# Patient Record
Sex: FEMALE | Race: WHITE | Employment: OTHER | URBAN - METROPOLITAN AREA
[De-identification: names, ages, dates, MRNs, and addresses within clinical notes are randomized per-mention and may not be internally consistent; named-entity substitution may affect disease eponyms.]

---

## 2018-03-29 ENCOUNTER — NURSE ONLY (OUTPATIENT)
Dept: FAMILY MEDICINE CLINIC | Age: 83
End: 2018-03-29

## 2018-03-29 DIAGNOSIS — Z51.81 ANTICOAGULATION MANAGEMENT ENCOUNTER: Primary | ICD-10-CM

## 2018-03-29 DIAGNOSIS — Z79.01 ANTICOAGULATION MANAGEMENT ENCOUNTER: Primary | ICD-10-CM

## 2018-04-02 ENCOUNTER — OFFICE VISIT (OUTPATIENT)
Dept: FAMILY MEDICINE CLINIC | Age: 83
End: 2018-04-02
Payer: MEDICARE

## 2018-04-02 VITALS
HEART RATE: 77 BPM | SYSTOLIC BLOOD PRESSURE: 138 MMHG | TEMPERATURE: 98.2 F | BODY MASS INDEX: 26.31 KG/M2 | RESPIRATION RATE: 20 BRPM | WEIGHT: 154.12 LBS | HEIGHT: 64 IN | OXYGEN SATURATION: 96 % | DIASTOLIC BLOOD PRESSURE: 68 MMHG

## 2018-04-02 DIAGNOSIS — Z95.2 S/P MVR (MITRAL VALVE REPLACEMENT): Primary | ICD-10-CM

## 2018-04-02 DIAGNOSIS — F33.1 MAJOR DEPRESSIVE DISORDER, RECURRENT EPISODE, MODERATE (HCC): ICD-10-CM

## 2018-04-02 DIAGNOSIS — I10 ESSENTIAL HYPERTENSION: ICD-10-CM

## 2018-04-02 PROCEDURE — 99213 OFFICE O/P EST LOW 20 MIN: CPT | Performed by: FAMILY MEDICINE

## 2018-04-04 ENCOUNTER — NURSE ONLY (OUTPATIENT)
Dept: FAMILY MEDICINE CLINIC | Age: 83
End: 2018-04-04
Payer: MEDICARE

## 2018-04-04 ENCOUNTER — HOSPITAL ENCOUNTER (OUTPATIENT)
Age: 83
Discharge: HOME | End: 2018-04-06
Payer: MEDICARE

## 2018-04-04 DIAGNOSIS — Z51.81 ANTICOAGULATION MANAGEMENT ENCOUNTER: ICD-10-CM

## 2018-04-04 DIAGNOSIS — I10 ESSENTIAL HYPERTENSION: ICD-10-CM

## 2018-04-04 DIAGNOSIS — Z79.01 ANTICOAGULATION MANAGEMENT ENCOUNTER: ICD-10-CM

## 2018-04-04 DIAGNOSIS — E78.2 MIXED HYPERCHOLESTEROLEMIA AND HYPERTRIGLYCERIDEMIA: ICD-10-CM

## 2018-04-04 LAB
ALBUMIN SERPL-MCNC: 3.8 G/DL (ref 3.5–5.2)
ALP BLD-CCNC: 56 U/L (ref 35–104)
ALT SERPL-CCNC: 16 U/L (ref 0–32)
ANION GAP SERPL CALCULATED.3IONS-SCNC: 14 MMOL/L (ref 7–16)
AST SERPL-CCNC: 29 U/L (ref 0–31)
BASOPHILS ABSOLUTE: 0.04 E9/L (ref 0–0.2)
BASOPHILS RELATIVE PERCENT: 0.8 % (ref 0–2)
BILIRUB SERPL-MCNC: 0.2 MG/DL (ref 0–1.2)
BUN BLDV-MCNC: 17 MG/DL (ref 8–23)
CALCIUM SERPL-MCNC: 9.6 MG/DL (ref 8.6–10.2)
CHLORIDE BLD-SCNC: 104 MMOL/L (ref 98–107)
CHOLESTEROL, TOTAL: 186 MG/DL (ref 0–199)
CO2: 24 MMOL/L (ref 22–29)
CREAT SERPL-MCNC: 0.6 MG/DL (ref 0.5–1)
EOSINOPHILS ABSOLUTE: 0.19 E9/L (ref 0.05–0.5)
EOSINOPHILS RELATIVE PERCENT: 3.9 % (ref 0–6)
GFR AFRICAN AMERICAN: >60
GFR NON-AFRICAN AMERICAN: >60 ML/MIN/1.73
GLUCOSE BLD-MCNC: 96 MG/DL (ref 74–109)
HCT VFR BLD CALC: 38.2 % (ref 34–48)
HDLC SERPL-MCNC: 50 MG/DL
HEMOGLOBIN: 12.6 G/DL (ref 11.5–15.5)
IMMATURE GRANULOCYTES #: 0.02 E9/L
IMMATURE GRANULOCYTES %: 0.4 % (ref 0–5)
INR BLD: 2.6
LDL CHOLESTEROL CALCULATED: 100 MG/DL (ref 0–99)
LYMPHOCYTES ABSOLUTE: 1.21 E9/L (ref 1.5–4)
LYMPHOCYTES RELATIVE PERCENT: 25.1 % (ref 20–42)
MCH RBC QN AUTO: 31.7 PG (ref 26–35)
MCHC RBC AUTO-ENTMCNC: 33 % (ref 32–34.5)
MCV RBC AUTO: 96 FL (ref 80–99.9)
MONOCYTES ABSOLUTE: 0.74 E9/L (ref 0.1–0.95)
MONOCYTES RELATIVE PERCENT: 15.3 % (ref 2–12)
NEUTROPHILS ABSOLUTE: 2.63 E9/L (ref 1.8–7.3)
NEUTROPHILS RELATIVE PERCENT: 54.5 % (ref 43–80)
PDW BLD-RTO: 14.9 FL (ref 11.5–15)
PLATELET # BLD: 234 E9/L (ref 130–450)
PMV BLD AUTO: 10.1 FL (ref 7–12)
POTASSIUM SERPL-SCNC: 4.3 MMOL/L (ref 3.5–5)
PROTHROMBIN TIME: 29 SEC (ref 9.3–12.4)
RBC # BLD: 3.98 E12/L (ref 3.5–5.5)
SODIUM BLD-SCNC: 142 MMOL/L (ref 132–146)
TOTAL PROTEIN: 6.7 G/DL (ref 6.4–8.3)
TRIGL SERPL-MCNC: 178 MG/DL (ref 0–149)
VLDLC SERPL CALC-MCNC: 36 MG/DL
WBC # BLD: 4.8 E9/L (ref 4.5–11.5)

## 2018-04-04 PROCEDURE — 80061 LIPID PANEL: CPT

## 2018-04-04 PROCEDURE — 36415 COLL VENOUS BLD VENIPUNCTURE: CPT | Performed by: FAMILY MEDICINE

## 2018-04-04 PROCEDURE — 85610 PROTHROMBIN TIME: CPT

## 2018-04-04 PROCEDURE — 80053 COMPREHEN METABOLIC PANEL: CPT

## 2018-04-04 PROCEDURE — 85025 COMPLETE CBC W/AUTO DIFF WBC: CPT

## 2018-04-05 ENCOUNTER — ANTI-COAG VISIT (OUTPATIENT)
Dept: FAMILY MEDICINE CLINIC | Age: 83
End: 2018-04-05

## 2018-05-03 ENCOUNTER — OFFICE VISIT (OUTPATIENT)
Dept: FAMILY MEDICINE CLINIC | Age: 83
End: 2018-05-03
Payer: MEDICARE

## 2018-05-03 VITALS
HEIGHT: 65 IN | RESPIRATION RATE: 20 BRPM | SYSTOLIC BLOOD PRESSURE: 138 MMHG | DIASTOLIC BLOOD PRESSURE: 76 MMHG | HEART RATE: 76 BPM | OXYGEN SATURATION: 97 % | TEMPERATURE: 98.1 F | WEIGHT: 151.12 LBS | BODY MASS INDEX: 25.18 KG/M2

## 2018-05-03 DIAGNOSIS — Z51.81 ANTICOAGULATION MANAGEMENT ENCOUNTER: ICD-10-CM

## 2018-05-03 DIAGNOSIS — E78.2 MIXED HYPERCHOLESTEROLEMIA AND HYPERTRIGLYCERIDEMIA: ICD-10-CM

## 2018-05-03 DIAGNOSIS — Z95.2 S/P MVR (MITRAL VALVE REPLACEMENT): Primary | ICD-10-CM

## 2018-05-03 DIAGNOSIS — I10 ESSENTIAL HYPERTENSION: ICD-10-CM

## 2018-05-03 DIAGNOSIS — Z79.01 ANTICOAGULATION MANAGEMENT ENCOUNTER: ICD-10-CM

## 2018-05-03 LAB
INTERNATIONAL NORMALIZATION RATIO, POC: 2.2
PROTHROMBIN TIME, POC: 26.9

## 2018-05-03 PROCEDURE — 99213 OFFICE O/P EST LOW 20 MIN: CPT | Performed by: FAMILY MEDICINE

## 2018-05-03 PROCEDURE — 85610 PROTHROMBIN TIME: CPT | Performed by: FAMILY MEDICINE

## 2018-05-25 ENCOUNTER — ANTI-COAG VISIT (OUTPATIENT)
Dept: FAMILY MEDICINE CLINIC | Age: 83
End: 2018-05-25

## 2018-06-05 ENCOUNTER — OFFICE VISIT (OUTPATIENT)
Dept: FAMILY MEDICINE CLINIC | Age: 83
End: 2018-06-05
Payer: MEDICARE

## 2018-06-05 VITALS
SYSTOLIC BLOOD PRESSURE: 120 MMHG | BODY MASS INDEX: 25.16 KG/M2 | DIASTOLIC BLOOD PRESSURE: 68 MMHG | WEIGHT: 151 LBS | HEART RATE: 97 BPM | TEMPERATURE: 98 F | HEIGHT: 65 IN | RESPIRATION RATE: 16 BRPM | OXYGEN SATURATION: 98 %

## 2018-06-05 DIAGNOSIS — Z00.00 ROUTINE GENERAL MEDICAL EXAMINATION AT A HEALTH CARE FACILITY: Primary | ICD-10-CM

## 2018-06-05 DIAGNOSIS — I10 ESSENTIAL HYPERTENSION: ICD-10-CM

## 2018-06-05 DIAGNOSIS — Z95.2 S/P MVR (MITRAL VALVE REPLACEMENT): ICD-10-CM

## 2018-06-05 DIAGNOSIS — E78.2 MIXED HYPERCHOLESTEROLEMIA AND HYPERTRIGLYCERIDEMIA: ICD-10-CM

## 2018-06-05 PROCEDURE — G0439 PPPS, SUBSEQ VISIT: HCPCS | Performed by: FAMILY MEDICINE

## 2018-06-05 ASSESSMENT — LIFESTYLE VARIABLES: HOW OFTEN DO YOU HAVE A DRINK CONTAINING ALCOHOL: 0

## 2018-06-05 ASSESSMENT — PATIENT HEALTH QUESTIONNAIRE - PHQ9: SUM OF ALL RESPONSES TO PHQ QUESTIONS 1-9: 0

## 2018-06-05 ASSESSMENT — ANXIETY QUESTIONNAIRES: GAD7 TOTAL SCORE: 0

## 2018-07-03 ENCOUNTER — ANTI-COAG VISIT (OUTPATIENT)
Dept: FAMILY MEDICINE CLINIC | Age: 83
End: 2018-07-03

## 2018-07-09 ENCOUNTER — OFFICE VISIT (OUTPATIENT)
Dept: FAMILY MEDICINE CLINIC | Age: 83
End: 2018-07-09
Payer: MEDICARE

## 2018-07-09 VITALS
SYSTOLIC BLOOD PRESSURE: 160 MMHG | OXYGEN SATURATION: 93 % | WEIGHT: 154 LBS | TEMPERATURE: 98.2 F | HEIGHT: 64 IN | HEART RATE: 73 BPM | DIASTOLIC BLOOD PRESSURE: 90 MMHG | BODY MASS INDEX: 26.29 KG/M2

## 2018-07-09 DIAGNOSIS — E78.2 MIXED HYPERCHOLESTEROLEMIA AND HYPERTRIGLYCERIDEMIA: ICD-10-CM

## 2018-07-09 DIAGNOSIS — I10 ESSENTIAL HYPERTENSION: ICD-10-CM

## 2018-07-09 DIAGNOSIS — Z95.2 S/P MVR (MITRAL VALVE REPLACEMENT): ICD-10-CM

## 2018-07-09 DIAGNOSIS — Z51.81 ANTICOAGULATION MANAGEMENT ENCOUNTER: Primary | ICD-10-CM

## 2018-07-09 DIAGNOSIS — Z79.01 ANTICOAGULATION MANAGEMENT ENCOUNTER: Primary | ICD-10-CM

## 2018-07-09 LAB
INTERNATIONAL NORMALIZATION RATIO, POC: 3.3
PROTHROMBIN TIME, POC: 39.2

## 2018-07-09 PROCEDURE — 85610 PROTHROMBIN TIME: CPT | Performed by: FAMILY MEDICINE

## 2018-07-09 PROCEDURE — 99213 OFFICE O/P EST LOW 20 MIN: CPT | Performed by: FAMILY MEDICINE

## 2018-07-09 NOTE — PROGRESS NOTES
OFFICE PROGRESS NOTE      SUBJECTIVE:        Patient ID:   Yuniel Salgado is a [de-identified] y.o. female who presents for   Chief Complaint   Patient presents with    Hypertension    Office Visit for Anticoagulation Management     1 mo F/U. Takes Coumadin 3mg daily            HPI:     FEELS GOOD. WATCHING DIET GOOD. WALKING SOME FOR EXERCISE. TAKING MEDICATIONS REGULARLY. Prior to Admission medications    Medication Sig Start Date End Date Taking?  Authorizing Provider   warfarin (COUMADIN) 3 MG tablet Take 1 tablet by mouth daily 3/1/18  Yes Stephanie Camarillo MD   simvastatin (ZOCOR) 20 MG tablet Take 1 tablet by mouth nightly 3/1/18  Yes Stephanie Camarillo MD   ranitidine (ZANTAC) 150 MG tablet take 1 to 2 tablets by mouth every evening if needed 1/9/18  Yes Historical Provider, MD   pantoprazole (PROTONIX) 40 MG tablet take 1 tablet by mouth once daily 11/13/17  Yes Historical Provider, MD   venlafaxine (EFFEXOR) 37.5 MG tablet Take 37.5 mg by mouth 3 times daily 9/12/17  Yes Historical Provider, MD   LORazepam (ATIVAN) 0.5 MG tablet Take 0.5 mg by mouth daily 6/15/16  Yes Historical Provider, MD   QUEtiapine (SEROQUEL) 300 MG tablet Take 300 mg by mouth nightly  2/28/16  Yes Historical Provider, MD     Social History     Social History    Marital status:      Spouse name: N/A    Number of children: N/A    Years of education: N/A     Social History Main Topics    Smoking status: Never Smoker    Smokeless tobacco: Never Used    Alcohol use No      Comment: RARELY    Drug use: No    Sexual activity: No     Other Topics Concern    None     Social History Narrative    None       I have reviewed Zacks allergies, medications, problem list, medical, social and family history and have updated as needed in the electronic medical record  Review Of Systems:    Skin: no abnormal pigmentation, rash, scaling, itching, masses, hair or nail changes  Eyes: no

## 2018-07-24 ENCOUNTER — ANTI-COAG VISIT (OUTPATIENT)
Dept: FAMILY MEDICINE CLINIC | Age: 83
End: 2018-07-24

## 2018-08-09 ENCOUNTER — OFFICE VISIT (OUTPATIENT)
Dept: FAMILY MEDICINE CLINIC | Age: 83
End: 2018-08-09
Payer: MEDICARE

## 2018-08-09 VITALS
RESPIRATION RATE: 16 BRPM | TEMPERATURE: 98.1 F | HEIGHT: 65 IN | BODY MASS INDEX: 25.49 KG/M2 | WEIGHT: 153 LBS | DIASTOLIC BLOOD PRESSURE: 62 MMHG | HEART RATE: 64 BPM | OXYGEN SATURATION: 98 % | SYSTOLIC BLOOD PRESSURE: 124 MMHG

## 2018-08-09 DIAGNOSIS — Z51.81 ANTICOAGULATION MANAGEMENT ENCOUNTER: Primary | ICD-10-CM

## 2018-08-09 DIAGNOSIS — I10 ESSENTIAL HYPERTENSION: ICD-10-CM

## 2018-08-09 DIAGNOSIS — E78.00 HYPERCHOLESTEREMIA: ICD-10-CM

## 2018-08-09 DIAGNOSIS — Z79.01 ANTICOAGULATION MANAGEMENT ENCOUNTER: Primary | ICD-10-CM

## 2018-08-09 DIAGNOSIS — Z95.2 S/P MVR (MITRAL VALVE REPLACEMENT): ICD-10-CM

## 2018-08-09 LAB
INTERNATIONAL NORMALIZATION RATIO, POC: 2.6
PROTHROMBIN TIME, POC: 31.1

## 2018-08-09 PROCEDURE — 85610 PROTHROMBIN TIME: CPT | Performed by: FAMILY MEDICINE

## 2018-08-09 PROCEDURE — 99213 OFFICE O/P EST LOW 20 MIN: CPT | Performed by: FAMILY MEDICINE

## 2018-08-09 RX ORDER — WARFARIN SODIUM 3 MG/1
3 TABLET ORAL DAILY
Qty: 90 TABLET | Refills: 0 | Status: SHIPPED | OUTPATIENT
Start: 2018-08-09 | End: 2018-10-29 | Stop reason: SDUPTHER

## 2018-08-09 RX ORDER — SIMVASTATIN 20 MG
20 TABLET ORAL NIGHTLY
Qty: 90 TABLET | Refills: 0 | Status: SHIPPED | OUTPATIENT
Start: 2018-08-09 | End: 2019-01-07 | Stop reason: SDUPTHER

## 2018-08-09 NOTE — PATIENT INSTRUCTIONS
LOW SALT  AND LOW FAT DIET. CONTINUE CURRENT MEDICATIONS TAKING REGULARLY. CONTINUE COUMADIN SAME DOSE. REGULAR WALKING ADVISED. NEXT APPOINTMENT IN 1 MONTH.
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
## 2018-09-11 ENCOUNTER — OFFICE VISIT (OUTPATIENT)
Dept: FAMILY MEDICINE CLINIC | Age: 83
End: 2018-09-11
Payer: MEDICARE

## 2018-09-11 VITALS
RESPIRATION RATE: 16 BRPM | BODY MASS INDEX: 25.39 KG/M2 | HEIGHT: 65 IN | WEIGHT: 152.4 LBS | HEART RATE: 74 BPM | DIASTOLIC BLOOD PRESSURE: 68 MMHG | SYSTOLIC BLOOD PRESSURE: 132 MMHG | OXYGEN SATURATION: 93 % | TEMPERATURE: 98 F

## 2018-09-11 DIAGNOSIS — F33.1 MAJOR DEPRESSIVE DISORDER, RECURRENT EPISODE, MODERATE (HCC): ICD-10-CM

## 2018-09-11 DIAGNOSIS — E78.2 MIXED HYPERCHOLESTEROLEMIA AND HYPERTRIGLYCERIDEMIA: ICD-10-CM

## 2018-09-11 DIAGNOSIS — Z79.01 ANTICOAGULATION MANAGEMENT ENCOUNTER: ICD-10-CM

## 2018-09-11 DIAGNOSIS — I10 ESSENTIAL HYPERTENSION: Primary | ICD-10-CM

## 2018-09-11 DIAGNOSIS — Z51.81 ANTICOAGULATION MANAGEMENT ENCOUNTER: ICD-10-CM

## 2018-09-11 DIAGNOSIS — Z95.2 S/P MVR (MITRAL VALVE REPLACEMENT): ICD-10-CM

## 2018-09-11 LAB
INTERNATIONAL NORMALIZATION RATIO, POC: 1.4
PROTHROMBIN TIME, POC: 17.3

## 2018-09-11 PROCEDURE — 85610 PROTHROMBIN TIME: CPT | Performed by: FAMILY MEDICINE

## 2018-09-11 PROCEDURE — 99213 OFFICE O/P EST LOW 20 MIN: CPT | Performed by: FAMILY MEDICINE
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
## 2018-09-13 ENCOUNTER — ANTI-COAG VISIT (OUTPATIENT)
Dept: FAMILY MEDICINE CLINIC | Age: 83
End: 2018-09-13

## 2018-10-09 ENCOUNTER — ANTI-COAG VISIT (OUTPATIENT)
Dept: FAMILY MEDICINE CLINIC | Age: 83
End: 2018-10-09

## 2018-10-11 ENCOUNTER — OFFICE VISIT (OUTPATIENT)
Dept: FAMILY MEDICINE CLINIC | Age: 83
End: 2018-10-11
Payer: MEDICARE

## 2018-10-11 VITALS
SYSTOLIC BLOOD PRESSURE: 138 MMHG | OXYGEN SATURATION: 96 % | HEIGHT: 65 IN | TEMPERATURE: 97.5 F | HEART RATE: 85 BPM | RESPIRATION RATE: 20 BRPM | DIASTOLIC BLOOD PRESSURE: 62 MMHG | BODY MASS INDEX: 25.16 KG/M2 | WEIGHT: 151 LBS

## 2018-10-11 DIAGNOSIS — I10 ESSENTIAL HYPERTENSION: Primary | ICD-10-CM

## 2018-10-11 DIAGNOSIS — E78.00 HYPERCHOLESTEREMIA: ICD-10-CM

## 2018-10-11 DIAGNOSIS — Z51.81 ANTICOAGULATION MANAGEMENT ENCOUNTER: ICD-10-CM

## 2018-10-11 DIAGNOSIS — Z23 FLU VACCINE NEED: ICD-10-CM

## 2018-10-11 DIAGNOSIS — Z95.2 S/P MVR (MITRAL VALVE REPLACEMENT): ICD-10-CM

## 2018-10-11 DIAGNOSIS — Z79.01 ANTICOAGULATION MANAGEMENT ENCOUNTER: ICD-10-CM

## 2018-10-11 LAB
INTERNATIONAL NORMALIZATION RATIO, POC: 2.2
PROTHROMBIN TIME, POC: 26

## 2018-10-11 PROCEDURE — G0008 ADMIN INFLUENZA VIRUS VAC: HCPCS | Performed by: FAMILY MEDICINE

## 2018-10-11 PROCEDURE — 99213 OFFICE O/P EST LOW 20 MIN: CPT | Performed by: FAMILY MEDICINE

## 2018-10-11 PROCEDURE — 85610 PROTHROMBIN TIME: CPT | Performed by: FAMILY MEDICINE

## 2018-10-11 PROCEDURE — 90662 IIV NO PRSV INCREASED AG IM: CPT | Performed by: FAMILY MEDICINE

## 2018-10-11 NOTE — PROGRESS NOTES
masses, hair or nail changes  Eyes: no blurring, diplopia, or eye pain  Ears/Nose/Throat: no hearing loss, tinnitus, vertigo, nosebleed, nasal congestion, rhinorrhea, sore throat  Respiratory: no cough, pleuritic chest pain, dyspnea, or wheezing  Cardiovascular: no chest pain, angina, dyspnea on exertion, orthopnea, PND, palpitations, or claudication  Gastrointestinal: no nausea, vomiting, heartburn, diarrhea, constipation, bloating,  abdominal pain, or blood per rectum. Appetite is good  Genitourinary: no urinary urgency, frequency, dysuria, nocturia, hesitancy, or incontinence  Musculoskeletal: joint pains off and on. Morning stiffness. Ambulating well  Neurologic: no paralysis, paresis, paresthesia, seizures, tremors, or headaches  Hematologic/Lymphatic/Immunologic: no anemia, abnormal bleeding/bruising, fever, chills, night sweats, swollen glands, or unexplained weight loss  Endocrine: no heat or cold intolerance and no polyphagia, polydipsia, or polyuria        OBJECTIVE:     VS:  Wt Readings from Last 3 Encounters:   10/11/18 151 lb (68.5 kg)   09/11/18 152 lb 6.4 oz (69.1 kg)   08/09/18 153 lb (69.4 kg)     Temp Readings from Last 3 Encounters:   10/11/18 97.5 °F (36.4 °C) (Temporal)   09/11/18 98 °F (36.7 °C) (Temporal)   08/09/18 98.1 °F (36.7 °C) (Temporal)     BP Readings from Last 3 Encounters:   10/11/18 138/62   09/11/18 132/68   08/09/18 124/62        General appearance: Alert, Awake, Oriented times 3, no distress  Skin: Warm and dry  Head: Normocephalic. No masses, lesions or tenderness noted  Eyes: Conjunctivae appear normal. PERLE  Ears: External ears normal  Nose/Sinuses: Nares normal. Septum midline. Mucosa normal. No drainage  Oropharynx: Oropharynx clear with no exudate seen  Neck: Neck supple. No jugular venous distension, lymphadenopathy or thyromegaly Trachea midline  Lungs: Lungs clear to auscultation bilaterally. No ronchi, crackles or wheezes  Heart: S1 S2  Regular rate and rhythm.  No

## 2018-10-29 RX ORDER — WARFARIN SODIUM 3 MG/1
3 TABLET ORAL DAILY
Qty: 90 TABLET | Refills: 0 | Status: SHIPPED | OUTPATIENT
Start: 2018-10-29 | End: 2019-01-07 | Stop reason: SDUPTHER

## 2018-11-26 ENCOUNTER — OFFICE VISIT (OUTPATIENT)
Dept: FAMILY MEDICINE CLINIC | Age: 83
End: 2018-11-26
Payer: MEDICARE

## 2018-11-26 VITALS
DIASTOLIC BLOOD PRESSURE: 70 MMHG | HEART RATE: 78 BPM | HEIGHT: 64 IN | OXYGEN SATURATION: 95 % | SYSTOLIC BLOOD PRESSURE: 128 MMHG | BODY MASS INDEX: 26.29 KG/M2 | WEIGHT: 154 LBS | RESPIRATION RATE: 20 BRPM | TEMPERATURE: 97.9 F

## 2018-11-26 DIAGNOSIS — Z95.2 S/P MVR (MITRAL VALVE REPLACEMENT): ICD-10-CM

## 2018-11-26 DIAGNOSIS — E78.2 MIXED HYPERCHOLESTEROLEMIA AND HYPERTRIGLYCERIDEMIA: Primary | ICD-10-CM

## 2018-11-26 DIAGNOSIS — Z79.01 ANTICOAGULATION MANAGEMENT ENCOUNTER: ICD-10-CM

## 2018-11-26 DIAGNOSIS — I10 ESSENTIAL HYPERTENSION: ICD-10-CM

## 2018-11-26 DIAGNOSIS — Z51.81 ANTICOAGULATION MANAGEMENT ENCOUNTER: ICD-10-CM

## 2018-11-26 LAB
INTERNATIONAL NORMALIZATION RATIO, POC: 3.1
PROTHROMBIN TIME, POC: 37.3

## 2018-11-26 PROCEDURE — 85610 PROTHROMBIN TIME: CPT | Performed by: FAMILY MEDICINE

## 2018-11-26 PROCEDURE — 99213 OFFICE O/P EST LOW 20 MIN: CPT | Performed by: FAMILY MEDICINE
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
## 2018-12-04 ENCOUNTER — HOSPITAL ENCOUNTER (OUTPATIENT)
Dept: MAMMOGRAPHY | Age: 83
Discharge: HOME | End: 2018-12-06
Payer: MEDICARE

## 2018-12-04 DIAGNOSIS — Z12.31 SCREENING MAMMOGRAM, ENCOUNTER FOR: ICD-10-CM

## 2018-12-04 PROCEDURE — 77067 SCR MAMMO BI INCL CAD: CPT

## 2019-01-03 ENCOUNTER — ANTI-COAG VISIT (OUTPATIENT)
Dept: FAMILY MEDICINE CLINIC | Age: 84
End: 2019-01-03

## 2019-01-07 ENCOUNTER — OFFICE VISIT (OUTPATIENT)
Dept: FAMILY MEDICINE CLINIC | Age: 84
End: 2019-01-07
Payer: MEDICARE

## 2019-01-07 VITALS
WEIGHT: 156 LBS | BODY MASS INDEX: 26.63 KG/M2 | HEIGHT: 64 IN | DIASTOLIC BLOOD PRESSURE: 78 MMHG | RESPIRATION RATE: 16 BRPM | OXYGEN SATURATION: 98 % | TEMPERATURE: 97 F | SYSTOLIC BLOOD PRESSURE: 118 MMHG | HEART RATE: 68 BPM

## 2019-01-07 DIAGNOSIS — Z95.2 S/P MVR (MITRAL VALVE REPLACEMENT): ICD-10-CM

## 2019-01-07 DIAGNOSIS — E78.00 HYPERCHOLESTEREMIA: ICD-10-CM

## 2019-01-07 DIAGNOSIS — Z51.81 ANTICOAGULATION MANAGEMENT ENCOUNTER: ICD-10-CM

## 2019-01-07 DIAGNOSIS — Z79.01 ANTICOAGULATION MANAGEMENT ENCOUNTER: ICD-10-CM

## 2019-01-07 DIAGNOSIS — I10 ESSENTIAL HYPERTENSION: Primary | ICD-10-CM

## 2019-01-07 LAB
INR BLD: 4.8
PROTIME: 57.7 SECONDS

## 2019-01-07 PROCEDURE — 99213 OFFICE O/P EST LOW 20 MIN: CPT | Performed by: FAMILY MEDICINE

## 2019-01-07 RX ORDER — RANITIDINE 150 MG/1
150 TABLET ORAL DAILY PRN
Qty: 60 TABLET | Refills: 0 | Status: SHIPPED | OUTPATIENT
Start: 2019-01-07 | End: 2019-02-11 | Stop reason: SDUPTHER

## 2019-01-07 RX ORDER — WARFARIN SODIUM 2.5 MG/1
2.5 TABLET ORAL DAILY
Qty: 30 TABLET | Refills: 3 | Status: SHIPPED | OUTPATIENT
Start: 2019-01-07 | End: 2019-03-12 | Stop reason: DRUGHIGH

## 2019-01-07 RX ORDER — SIMVASTATIN 20 MG
20 TABLET ORAL NIGHTLY
Qty: 90 TABLET | Refills: 0 | Status: SHIPPED
Start: 2019-01-07 | End: 2020-03-03 | Stop reason: ALTCHOICE

## 2019-01-07 RX ORDER — PANTOPRAZOLE SODIUM 40 MG/1
40 TABLET, DELAYED RELEASE ORAL DAILY
Qty: 30 TABLET | Refills: 0 | Status: SHIPPED
Start: 2019-01-07 | End: 2020-06-11 | Stop reason: SDUPTHER

## 2019-01-14 ENCOUNTER — NURSE ONLY (OUTPATIENT)
Dept: FAMILY MEDICINE CLINIC | Age: 84
End: 2019-01-14
Payer: MEDICARE

## 2019-01-14 DIAGNOSIS — Z95.2 S/P MVR (MITRAL VALVE REPLACEMENT): Primary | ICD-10-CM

## 2019-01-14 LAB
INTERNATIONAL NORMALIZATION RATIO, POC: 1.8
PROTHROMBIN TIME, POC: 22.2

## 2019-01-14 PROCEDURE — 85610 PROTHROMBIN TIME: CPT | Performed by: FAMILY MEDICINE

## 2019-01-17 ENCOUNTER — OFFICE VISIT (OUTPATIENT)
Dept: FAMILY MEDICINE CLINIC | Age: 84
End: 2019-01-17
Payer: MEDICARE

## 2019-01-17 VITALS
HEART RATE: 80 BPM | HEIGHT: 64 IN | DIASTOLIC BLOOD PRESSURE: 81 MMHG | BODY MASS INDEX: 26.63 KG/M2 | TEMPERATURE: 97 F | WEIGHT: 156 LBS | SYSTOLIC BLOOD PRESSURE: 140 MMHG | RESPIRATION RATE: 20 BRPM | OXYGEN SATURATION: 98 %

## 2019-01-17 DIAGNOSIS — B34.9 ACUTE VIRAL SYNDROME: Primary | ICD-10-CM

## 2019-01-17 DIAGNOSIS — E78.2 MIXED HYPERCHOLESTEROLEMIA AND HYPERTRIGLYCERIDEMIA: ICD-10-CM

## 2019-01-17 PROCEDURE — 99214 OFFICE O/P EST MOD 30 MIN: CPT | Performed by: FAMILY MEDICINE

## 2019-01-17 RX ORDER — DICYCLOMINE HYDROCHLORIDE 10 MG/1
10 CAPSULE ORAL 4 TIMES DAILY PRN
Qty: 30 CAPSULE | Refills: 1 | Status: SHIPPED
Start: 2019-01-17 | End: 2020-03-03 | Stop reason: ALTCHOICE

## 2019-01-21 ENCOUNTER — APPOINTMENT (OUTPATIENT)
Dept: CT IMAGING | Age: 84
End: 2019-01-21
Payer: MEDICARE

## 2019-01-21 ENCOUNTER — APPOINTMENT (OUTPATIENT)
Dept: GENERAL RADIOLOGY | Age: 84
End: 2019-01-21
Payer: MEDICARE

## 2019-01-21 ENCOUNTER — HOSPITAL ENCOUNTER (EMERGENCY)
Age: 84
Discharge: HOME | End: 2019-01-21
Attending: EMERGENCY MEDICINE
Payer: MEDICARE

## 2019-01-21 VITALS
WEIGHT: 160 LBS | BODY MASS INDEX: 26.66 KG/M2 | HEIGHT: 65 IN | HEART RATE: 81 BPM | RESPIRATION RATE: 18 BRPM | OXYGEN SATURATION: 95 % | TEMPERATURE: 98.2 F | DIASTOLIC BLOOD PRESSURE: 68 MMHG | SYSTOLIC BLOOD PRESSURE: 148 MMHG

## 2019-01-21 DIAGNOSIS — K27.9 PUD (PEPTIC ULCER DISEASE): Primary | ICD-10-CM

## 2019-01-21 DIAGNOSIS — R10.10 PAIN OF UPPER ABDOMEN: ICD-10-CM

## 2019-01-21 LAB
ALBUMIN SERPL-MCNC: 4.3 G/DL (ref 3.5–5.2)
ALP BLD-CCNC: 77 U/L (ref 35–104)
ALT SERPL-CCNC: 19 U/L (ref 0–32)
ANION GAP SERPL CALCULATED.3IONS-SCNC: 13 MMOL/L (ref 7–16)
AST SERPL-CCNC: 23 U/L (ref 0–31)
BACTERIA: ABNORMAL /HPF
BASOPHILS ABSOLUTE: 0.03 E9/L (ref 0–0.2)
BASOPHILS RELATIVE PERCENT: 0.3 % (ref 0–2)
BILIRUB SERPL-MCNC: 1.1 MG/DL (ref 0–1.2)
BILIRUBIN URINE: ABNORMAL
BLOOD, URINE: NEGATIVE
BUN BLDV-MCNC: 10 MG/DL (ref 8–23)
CALCIUM SERPL-MCNC: 10.1 MG/DL (ref 8.6–10.2)
CHLORIDE BLD-SCNC: 100 MMOL/L (ref 98–107)
CLARITY: ABNORMAL
CO2: 27 MMOL/L (ref 22–29)
COLOR: YELLOW
CREAT SERPL-MCNC: 0.6 MG/DL (ref 0.5–1)
EKG ATRIAL RATE: 86 BPM
EKG Q-T INTERVAL: 450 MS
EKG QRS DURATION: 170 MS
EKG QTC CALCULATION (BAZETT): 506 MS
EKG R AXIS: -89 DEGREES
EKG T AXIS: 124 DEGREES
EKG VENTRICULAR RATE: 76 BPM
EOSINOPHILS ABSOLUTE: 0.04 E9/L (ref 0.05–0.5)
EOSINOPHILS RELATIVE PERCENT: 0.4 % (ref 0–6)
EPITHELIAL CELLS, UA: ABNORMAL /HPF
GFR AFRICAN AMERICAN: >60
GFR NON-AFRICAN AMERICAN: >60 ML/MIN/1.73
GLUCOSE BLD-MCNC: 123 MG/DL (ref 74–99)
GLUCOSE URINE: NEGATIVE MG/DL
HCT VFR BLD CALC: 42.1 % (ref 34–48)
HEMOGLOBIN: 14.6 G/DL (ref 11.5–15.5)
IMMATURE GRANULOCYTES #: 0.02 E9/L
IMMATURE GRANULOCYTES %: 0.2 % (ref 0–5)
INFLUENZA A BY PCR: NOT DETECTED
INFLUENZA B BY PCR: NOT DETECTED
INR BLD: 1.9
KETONES, URINE: 15 MG/DL
LACTIC ACID: 1.5 MMOL/L (ref 0.5–2.2)
LEUKOCYTE ESTERASE, URINE: ABNORMAL
LIPASE: 21 U/L (ref 13–60)
LYMPHOCYTES ABSOLUTE: 1.18 E9/L (ref 1.5–4)
LYMPHOCYTES RELATIVE PERCENT: 12.6 % (ref 20–42)
MCH RBC QN AUTO: 32.1 PG (ref 26–35)
MCHC RBC AUTO-ENTMCNC: 34.7 % (ref 32–34.5)
MCV RBC AUTO: 92.5 FL (ref 80–99.9)
MONOCYTES ABSOLUTE: 1.07 E9/L (ref 0.1–0.95)
MONOCYTES RELATIVE PERCENT: 11.4 % (ref 2–12)
NEUTROPHILS ABSOLUTE: 7.03 E9/L (ref 1.8–7.3)
NEUTROPHILS RELATIVE PERCENT: 75.1 % (ref 43–80)
NITRITE, URINE: NEGATIVE
PDW BLD-RTO: 13.2 FL (ref 11.5–15)
PH UA: 6 (ref 5–9)
PLATELET # BLD: 280 E9/L (ref 130–450)
PMV BLD AUTO: 10.1 FL (ref 7–12)
POTASSIUM SERPL-SCNC: 3.4 MMOL/L (ref 3.5–5)
PROTEIN UA: 100 MG/DL
PROTHROMBIN TIME: 21.2 SEC (ref 9.3–12.4)
RBC # BLD: 4.55 E12/L (ref 3.5–5.5)
RBC UA: ABNORMAL /HPF (ref 0–2)
SODIUM BLD-SCNC: 140 MMOL/L (ref 132–146)
SPECIFIC GRAVITY UA: 1.02 (ref 1–1.03)
TOTAL PROTEIN: 7.9 G/DL (ref 6.4–8.3)
TROPONIN: <0.01 NG/ML (ref 0–0.03)
UROBILINOGEN, URINE: 1 E.U./DL
WBC # BLD: 9.4 E9/L (ref 4.5–11.5)
WBC UA: ABNORMAL /HPF (ref 0–5)

## 2019-01-21 PROCEDURE — 96374 THER/PROPH/DIAG INJ IV PUSH: CPT

## 2019-01-21 PROCEDURE — 87502 INFLUENZA DNA AMP PROBE: CPT

## 2019-01-21 PROCEDURE — 81001 URINALYSIS AUTO W/SCOPE: CPT

## 2019-01-21 PROCEDURE — 74177 CT ABD & PELVIS W/CONTRAST: CPT

## 2019-01-21 PROCEDURE — 85025 COMPLETE CBC W/AUTO DIFF WBC: CPT

## 2019-01-21 PROCEDURE — 71045 X-RAY EXAM CHEST 1 VIEW: CPT

## 2019-01-21 PROCEDURE — 36415 COLL VENOUS BLD VENIPUNCTURE: CPT

## 2019-01-21 PROCEDURE — 84484 ASSAY OF TROPONIN QUANT: CPT

## 2019-01-21 PROCEDURE — 6360000004 HC RX CONTRAST MEDICATION: Performed by: RADIOLOGY

## 2019-01-21 PROCEDURE — 83605 ASSAY OF LACTIC ACID: CPT

## 2019-01-21 PROCEDURE — 80053 COMPREHEN METABOLIC PANEL: CPT

## 2019-01-21 PROCEDURE — 2580000003 HC RX 258: Performed by: PHYSICIAN ASSISTANT

## 2019-01-21 PROCEDURE — 6360000002 HC RX W HCPCS: Performed by: PHYSICIAN ASSISTANT

## 2019-01-21 PROCEDURE — 85610 PROTHROMBIN TIME: CPT

## 2019-01-21 PROCEDURE — 6370000000 HC RX 637 (ALT 250 FOR IP): Performed by: PHYSICIAN ASSISTANT

## 2019-01-21 PROCEDURE — 83690 ASSAY OF LIPASE: CPT

## 2019-01-21 PROCEDURE — 96375 TX/PRO/DX INJ NEW DRUG ADDON: CPT

## 2019-01-21 PROCEDURE — 99285 EMERGENCY DEPT VISIT HI MDM: CPT

## 2019-01-21 RX ORDER — SUCRALFATE 1 G/1
1 TABLET ORAL 4 TIMES DAILY
Qty: 40 TABLET | Refills: 0 | Status: SHIPPED | OUTPATIENT
Start: 2019-01-21 | End: 2020-03-03 | Stop reason: ALTCHOICE

## 2019-01-21 RX ORDER — 0.9 % SODIUM CHLORIDE 0.9 %
500 INTRAVENOUS SOLUTION INTRAVENOUS ONCE
Status: COMPLETED | OUTPATIENT
Start: 2019-01-21 | End: 2019-01-21

## 2019-01-21 RX ORDER — KETOROLAC TROMETHAMINE 4 MG/ML
SOLUTION/ DROPS OPHTHALMIC DAILY
COMMUNITY
End: 2019-12-09 | Stop reason: ALTCHOICE

## 2019-01-21 RX ORDER — ONDANSETRON 2 MG/ML
4 INJECTION INTRAMUSCULAR; INTRAVENOUS ONCE
Status: COMPLETED | OUTPATIENT
Start: 2019-01-21 | End: 2019-01-21

## 2019-01-21 RX ORDER — FENTANYL CITRATE 50 UG/ML
25 INJECTION, SOLUTION INTRAMUSCULAR; INTRAVENOUS ONCE
Status: COMPLETED | OUTPATIENT
Start: 2019-01-21 | End: 2019-01-21

## 2019-01-21 RX ADMIN — IOHEXOL 50 ML: 240 INJECTION, SOLUTION INTRATHECAL; INTRAVASCULAR; INTRAVENOUS; ORAL at 13:09

## 2019-01-21 RX ADMIN — SODIUM CHLORIDE 500 ML: 9 INJECTION, SOLUTION INTRAVENOUS at 10:24

## 2019-01-21 RX ADMIN — ONDANSETRON 4 MG: 2 INJECTION INTRAMUSCULAR; INTRAVENOUS at 10:25

## 2019-01-21 RX ADMIN — IOPAMIDOL 80 ML: 755 INJECTION, SOLUTION INTRAVENOUS at 13:09

## 2019-01-21 RX ADMIN — FENTANYL CITRATE 25 MCG: 50 INJECTION, SOLUTION INTRAMUSCULAR; INTRAVENOUS at 10:25

## 2019-01-21 RX ADMIN — LIDOCAINE HYDROCHLORIDE: 20 SOLUTION ORAL; TOPICAL at 10:30

## 2019-01-21 ASSESSMENT — PAIN DESCRIPTION - DESCRIPTORS: DESCRIPTORS: TIGHTNESS

## 2019-01-21 ASSESSMENT — PAIN SCALES - GENERAL: PAINLEVEL_OUTOF10: 10

## 2019-01-21 ASSESSMENT — PAIN DESCRIPTION - PAIN TYPE: TYPE: ACUTE PAIN

## 2019-01-21 ASSESSMENT — PAIN DESCRIPTION - ORIENTATION: ORIENTATION: RIGHT;LEFT;MID

## 2019-01-21 ASSESSMENT — PAIN DESCRIPTION - LOCATION: LOCATION: ABDOMEN

## 2019-02-11 ENCOUNTER — OFFICE VISIT (OUTPATIENT)
Dept: FAMILY MEDICINE CLINIC | Age: 84
End: 2019-02-11
Payer: MEDICARE

## 2019-02-11 VITALS
HEIGHT: 65 IN | OXYGEN SATURATION: 98 % | DIASTOLIC BLOOD PRESSURE: 68 MMHG | HEART RATE: 96 BPM | SYSTOLIC BLOOD PRESSURE: 138 MMHG | WEIGHT: 152 LBS | TEMPERATURE: 97.8 F | RESPIRATION RATE: 14 BRPM | BODY MASS INDEX: 25.33 KG/M2

## 2019-02-11 DIAGNOSIS — I10 ESSENTIAL HYPERTENSION: ICD-10-CM

## 2019-02-11 DIAGNOSIS — Z79.01 ANTICOAGULATION MANAGEMENT ENCOUNTER: Primary | ICD-10-CM

## 2019-02-11 DIAGNOSIS — Z51.81 ANTICOAGULATION MANAGEMENT ENCOUNTER: Primary | ICD-10-CM

## 2019-02-11 DIAGNOSIS — E78.2 MIXED HYPERCHOLESTEROLEMIA AND HYPERTRIGLYCERIDEMIA: ICD-10-CM

## 2019-02-11 DIAGNOSIS — Z95.2 S/P MVR (MITRAL VALVE REPLACEMENT): Primary | ICD-10-CM

## 2019-02-11 DIAGNOSIS — Z95.2 S/P MVR (MITRAL VALVE REPLACEMENT): ICD-10-CM

## 2019-02-11 PROCEDURE — 99213 OFFICE O/P EST LOW 20 MIN: CPT | Performed by: FAMILY MEDICINE

## 2019-02-11 RX ORDER — RANITIDINE 300 MG/1
TABLET ORAL
Refills: 0 | COMMUNITY
Start: 2019-02-01 | End: 2019-12-09 | Stop reason: ALTCHOICE

## 2019-02-11 RX ORDER — SUCRALFATE 1 G/10ML
SUSPENSION ORAL
Refills: 0 | COMMUNITY
Start: 2019-02-07 | End: 2019-06-20 | Stop reason: SDUPTHER

## 2019-02-12 ENCOUNTER — NURSE ONLY (OUTPATIENT)
Dept: FAMILY MEDICINE CLINIC | Age: 84
End: 2019-02-12
Payer: MEDICARE

## 2019-02-12 ENCOUNTER — HOSPITAL ENCOUNTER (OUTPATIENT)
Age: 84
Discharge: HOME | End: 2019-02-14
Payer: MEDICARE

## 2019-02-12 DIAGNOSIS — E78.2 MIXED HYPERCHOLESTEROLEMIA AND HYPERTRIGLYCERIDEMIA: ICD-10-CM

## 2019-02-12 DIAGNOSIS — I10 ESSENTIAL HYPERTENSION: ICD-10-CM

## 2019-02-12 LAB
ALBUMIN SERPL-MCNC: 3.8 G/DL (ref 3.5–5.2)
ALP BLD-CCNC: 59 U/L (ref 35–104)
ALT SERPL-CCNC: 19 U/L (ref 0–32)
ANION GAP SERPL CALCULATED.3IONS-SCNC: 11 MMOL/L (ref 7–16)
AST SERPL-CCNC: 18 U/L (ref 0–31)
BASOPHILS ABSOLUTE: 0.03 E9/L (ref 0–0.2)
BASOPHILS RELATIVE PERCENT: 0.5 % (ref 0–2)
BILIRUB SERPL-MCNC: 0.4 MG/DL (ref 0–1.2)
BUN BLDV-MCNC: 20 MG/DL (ref 8–23)
CALCIUM SERPL-MCNC: 9.8 MG/DL (ref 8.6–10.2)
CHLORIDE BLD-SCNC: 102 MMOL/L (ref 98–107)
CHOLESTEROL, TOTAL: 196 MG/DL (ref 0–199)
CO2: 26 MMOL/L (ref 22–29)
CREAT SERPL-MCNC: 0.6 MG/DL (ref 0.5–1)
EOSINOPHILS ABSOLUTE: 0.24 E9/L (ref 0.05–0.5)
EOSINOPHILS RELATIVE PERCENT: 4.2 % (ref 0–6)
GFR AFRICAN AMERICAN: >60
GFR NON-AFRICAN AMERICAN: >60 ML/MIN/1.73
GLUCOSE BLD-MCNC: 94 MG/DL (ref 74–99)
HCT VFR BLD CALC: 41 % (ref 34–48)
HDLC SERPL-MCNC: 43 MG/DL
HEMOGLOBIN: 13.5 G/DL (ref 11.5–15.5)
IMMATURE GRANULOCYTES #: 0.04 E9/L
IMMATURE GRANULOCYTES %: 0.7 % (ref 0–5)
INR BLD: 1.2
LDL CHOLESTEROL CALCULATED: 110 MG/DL (ref 0–99)
LYMPHOCYTES ABSOLUTE: 1.21 E9/L (ref 1.5–4)
LYMPHOCYTES RELATIVE PERCENT: 21.1 % (ref 20–42)
MCH RBC QN AUTO: 31.8 PG (ref 26–35)
MCHC RBC AUTO-ENTMCNC: 32.9 % (ref 32–34.5)
MCV RBC AUTO: 96.7 FL (ref 80–99.9)
MONOCYTES ABSOLUTE: 0.81 E9/L (ref 0.1–0.95)
MONOCYTES RELATIVE PERCENT: 14.1 % (ref 2–12)
NEUTROPHILS ABSOLUTE: 3.4 E9/L (ref 1.8–7.3)
NEUTROPHILS RELATIVE PERCENT: 59.4 % (ref 43–80)
PDW BLD-RTO: 13.9 FL (ref 11.5–15)
PLATELET # BLD: 245 E9/L (ref 130–450)
PMV BLD AUTO: 10.2 FL (ref 7–12)
POTASSIUM SERPL-SCNC: 4.4 MMOL/L (ref 3.5–5)
PROTHROMBIN TIME: 13.3 SEC (ref 9.3–12.4)
RBC # BLD: 4.24 E12/L (ref 3.5–5.5)
SODIUM BLD-SCNC: 139 MMOL/L (ref 132–146)
TOTAL PROTEIN: 6.7 G/DL (ref 6.4–8.3)
TRIGL SERPL-MCNC: 213 MG/DL (ref 0–149)
VLDLC SERPL CALC-MCNC: 43 MG/DL
WBC # BLD: 5.7 E9/L (ref 4.5–11.5)

## 2019-02-12 PROCEDURE — 85610 PROTHROMBIN TIME: CPT

## 2019-02-12 PROCEDURE — 85025 COMPLETE CBC W/AUTO DIFF WBC: CPT

## 2019-02-12 PROCEDURE — 80061 LIPID PANEL: CPT

## 2019-02-12 PROCEDURE — 36415 COLL VENOUS BLD VENIPUNCTURE: CPT | Performed by: FAMILY MEDICINE

## 2019-02-12 PROCEDURE — 80053 COMPREHEN METABOLIC PANEL: CPT

## 2019-03-12 ENCOUNTER — OFFICE VISIT (OUTPATIENT)
Dept: FAMILY MEDICINE CLINIC | Age: 84
End: 2019-03-12
Payer: MEDICARE

## 2019-03-12 VITALS
TEMPERATURE: 98.1 F | HEART RATE: 74 BPM | SYSTOLIC BLOOD PRESSURE: 128 MMHG | RESPIRATION RATE: 16 BRPM | BODY MASS INDEX: 25.79 KG/M2 | OXYGEN SATURATION: 96 % | WEIGHT: 154.8 LBS | DIASTOLIC BLOOD PRESSURE: 60 MMHG | HEIGHT: 65 IN

## 2019-03-12 DIAGNOSIS — I10 ESSENTIAL HYPERTENSION: ICD-10-CM

## 2019-03-12 DIAGNOSIS — Z79.01 ANTICOAGULATION MANAGEMENT ENCOUNTER: Primary | ICD-10-CM

## 2019-03-12 DIAGNOSIS — Z95.2 S/P MVR (MITRAL VALVE REPLACEMENT): ICD-10-CM

## 2019-03-12 DIAGNOSIS — E78.2 MIXED HYPERCHOLESTEROLEMIA AND HYPERTRIGLYCERIDEMIA: ICD-10-CM

## 2019-03-12 DIAGNOSIS — Z51.81 ANTICOAGULATION MANAGEMENT ENCOUNTER: Primary | ICD-10-CM

## 2019-03-12 LAB
INTERNATIONAL NORMALIZATION RATIO, POC: 1.7
PROTHROMBIN TIME, POC: 20.2

## 2019-03-12 PROCEDURE — 85610 PROTHROMBIN TIME: CPT | Performed by: FAMILY MEDICINE

## 2019-03-12 PROCEDURE — 99213 OFFICE O/P EST LOW 20 MIN: CPT | Performed by: FAMILY MEDICINE

## 2019-03-12 RX ORDER — WARFARIN SODIUM 3 MG/1
3 TABLET ORAL DAILY
Qty: 30 TABLET | Refills: 3 | Status: SHIPPED | OUTPATIENT
Start: 2019-03-12 | End: 2019-03-19 | Stop reason: SDUPTHER

## 2019-03-19 RX ORDER — WARFARIN SODIUM 3 MG/1
3 TABLET ORAL DAILY
Qty: 30 TABLET | Refills: 3 | Status: SHIPPED | OUTPATIENT
Start: 2019-03-19 | End: 2019-05-16 | Stop reason: DRUGHIGH

## 2019-04-15 ENCOUNTER — OFFICE VISIT (OUTPATIENT)
Dept: FAMILY MEDICINE CLINIC | Age: 84
End: 2019-04-15
Payer: MEDICARE

## 2019-04-15 VITALS
HEART RATE: 68 BPM | BODY MASS INDEX: 24.99 KG/M2 | TEMPERATURE: 97 F | RESPIRATION RATE: 16 BRPM | HEIGHT: 65 IN | WEIGHT: 150 LBS | OXYGEN SATURATION: 98 % | DIASTOLIC BLOOD PRESSURE: 80 MMHG | SYSTOLIC BLOOD PRESSURE: 128 MMHG

## 2019-04-15 DIAGNOSIS — E78.2 MIXED HYPERCHOLESTEROLEMIA AND HYPERTRIGLYCERIDEMIA: Primary | ICD-10-CM

## 2019-04-15 DIAGNOSIS — F33.1 MAJOR DEPRESSIVE DISORDER, RECURRENT EPISODE, MODERATE (HCC): ICD-10-CM

## 2019-04-15 DIAGNOSIS — I10 ESSENTIAL HYPERTENSION: ICD-10-CM

## 2019-04-15 DIAGNOSIS — Z95.2 S/P MVR (MITRAL VALVE REPLACEMENT): ICD-10-CM

## 2019-04-15 LAB
INTERNATIONAL NORMALIZATION RATIO, POC: 1.6
PROTHROMBIN TIME, POC: 22.7

## 2019-04-15 PROCEDURE — 99213 OFFICE O/P EST LOW 20 MIN: CPT | Performed by: FAMILY MEDICINE

## 2019-04-15 PROCEDURE — 85610 PROTHROMBIN TIME: CPT | Performed by: FAMILY MEDICINE
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
## 2019-04-15 NOTE — PATIENT INSTRUCTIONS
LOW SALT,LOW CARB. AND LOW FAT DIET. CONTINUE CURRENT MEDICATIONS TAKING REGULARLY. TAKE COUMADIN 3 MG. 2 TABLETS TODAY AND THEN CONTINUE 1 DAILY. REGULAR WALKING ADVISED. NEXT APPOINTMENT IN 1 MONTH.

## 2019-05-15 ENCOUNTER — ANTI-COAG VISIT (OUTPATIENT)
Dept: FAMILY MEDICINE CLINIC | Age: 84
End: 2019-05-15

## 2019-05-16 ENCOUNTER — OFFICE VISIT (OUTPATIENT)
Dept: FAMILY MEDICINE CLINIC | Age: 84
End: 2019-05-16
Payer: MEDICARE

## 2019-05-16 VITALS
BODY MASS INDEX: 25.24 KG/M2 | SYSTOLIC BLOOD PRESSURE: 134 MMHG | HEART RATE: 73 BPM | HEIGHT: 65 IN | OXYGEN SATURATION: 95 % | RESPIRATION RATE: 16 BRPM | WEIGHT: 151.5 LBS | DIASTOLIC BLOOD PRESSURE: 64 MMHG | TEMPERATURE: 97.7 F

## 2019-05-16 DIAGNOSIS — I10 ESSENTIAL HYPERTENSION: ICD-10-CM

## 2019-05-16 DIAGNOSIS — Z95.2 S/P MVR (MITRAL VALVE REPLACEMENT): Primary | ICD-10-CM

## 2019-05-16 DIAGNOSIS — Z79.01 ANTICOAGULATION MANAGEMENT ENCOUNTER: ICD-10-CM

## 2019-05-16 DIAGNOSIS — Z51.81 ANTICOAGULATION MANAGEMENT ENCOUNTER: ICD-10-CM

## 2019-05-16 DIAGNOSIS — F33.1 MAJOR DEPRESSIVE DISORDER, RECURRENT EPISODE, MODERATE (HCC): ICD-10-CM

## 2019-05-16 DIAGNOSIS — E78.2 MIXED HYPERCHOLESTEROLEMIA AND HYPERTRIGLYCERIDEMIA: ICD-10-CM

## 2019-05-16 LAB
INTERNATIONAL NORMALIZATION RATIO, POC: 1.3
PROTHROMBIN TIME, POC: 15.8

## 2019-05-16 PROCEDURE — 85610 PROTHROMBIN TIME: CPT | Performed by: FAMILY MEDICINE

## 2019-05-16 PROCEDURE — 99213 OFFICE O/P EST LOW 20 MIN: CPT | Performed by: FAMILY MEDICINE

## 2019-05-16 RX ORDER — WARFARIN SODIUM 4 MG/1
4 TABLET ORAL DAILY
Qty: 30 TABLET | Refills: 3 | Status: SHIPPED | OUTPATIENT
Start: 2019-05-16 | End: 2019-10-11 | Stop reason: SDUPTHER

## 2019-05-16 NOTE — PATIENT INSTRUCTIONS
LOW SALT,LOW CARB. AND LOW FAT DIET. CONTINUE CURRENT MEDICATIONS TAKING REGULARLY. TAKE COUMADIN 3 MG. 2 TABLETS TODAY AND THEN 4 MG. DAILY STARTING TOMORROW. REGULAR WALKING ADVISED. INR IN 1 WEEK. NEXT APPOINTMENT IN 1 MONTH.

## 2019-05-16 NOTE — PROGRESS NOTES
Provider, MD     Social History     Socioeconomic History    Marital status:      Spouse name: None    Number of children: None    Years of education: None    Highest education level: None   Occupational History    None   Social Needs    Financial resource strain: None    Food insecurity:     Worry: None     Inability: None    Transportation needs:     Medical: None     Non-medical: None   Tobacco Use    Smoking status: Never Smoker    Smokeless tobacco: Never Used   Substance and Sexual Activity    Alcohol use: No     Comment: RARELY    Drug use: No    Sexual activity: Never   Lifestyle    Physical activity:     Days per week: None     Minutes per session: None    Stress: None   Relationships    Social connections:     Talks on phone: None     Gets together: None     Attends Baptism service: None     Active member of club or organization: None     Attends meetings of clubs or organizations: None     Relationship status: None    Intimate partner violence:     Fear of current or ex partner: None     Emotionally abused: None     Physically abused: None     Forced sexual activity: None   Other Topics Concern    None   Social History Narrative    None       I have reviewed Zacks allergies, medications, problem list, medical, social and family history and have updated as needed in the electronic medical record  Review Of Systems:    Skin: no abnormal pigmentation, rash, scaling, itching, masses, hair or nail changes  Eyes: no blurring, diplopia, or eye pain  Ears/Nose/Throat: no hearing loss, tinnitus, vertigo, nosebleed, nasal congestion, rhinorrhea, sore throat  Respiratory: no cough, pleuritic chest pain, dyspnea, or wheezing  Cardiovascular: no chest pain, angina, dyspnea on exertion, orthopnea, PND, palpitations, or claudication  Gastrointestinal: no nausea, vomiting, heartburn, diarrhea, constipation, bloating,  abdominal pain, or blood per rectum. Appetite is good  Genitourinary: no urinary urgency, frequency, dysuria, nocturia, hesitancy, or incontinence  Musculoskeletal: joint pains off and on. Morning stiffness. Ambulating well  Neurologic: no paralysis, paresis, paresthesia, seizures, tremors, or headaches  Hematologic/Lymphatic/Immunologic: no anemia, abnormal bleeding/bruising, fever, chills, night sweats, swollen glands, or unexplained weight loss  Endocrine: no heat or cold intolerance and no polyphagia, polydipsia, or polyuria        OBJECTIVE:     VS:  Wt Readings from Last 3 Encounters:   05/16/19 151 lb 8 oz (68.7 kg)   04/15/19 150 lb (68 kg)   03/12/19 154 lb 12.8 oz (70.2 kg)     Temp Readings from Last 3 Encounters:   05/16/19 97.7 °F (36.5 °C) (Temporal)   04/15/19 97 °F (36.1 °C) (Temporal)   03/12/19 98.1 °F (36.7 °C) (Temporal)     BP Readings from Last 3 Encounters:   05/16/19 134/64   04/15/19 128/80   03/12/19 128/60        General appearance: Alert, Awake, Oriented times 3, no distress  Skin: Warm and dry  Head: Normocephalic. No masses, lesions or tenderness noted  Eyes: Conjunctivae appear normal. PERLE  Ears: External ears normal  Nose/Sinuses: Nares normal. Septum midline. Mucosa normal. No drainage  Oropharynx: Oropharynx clear with no exudate seen  Neck: Neck supple. No jugular venous distension, lymphadenopathy or thyromegaly Trachea midline  Lungs: Lungs clear to auscultation bilaterally. No ronchi, crackles or wheezes  Heart: S1 S2  Regular rate and rhythm. No rub, murmur or gallop  Abdomen: Abdomen soft, non-tender. BS normal. No masses, organomegaly  Extremities: Arthritic changes are noted. Movements are limited. Pedal pulses are normal.  Musculoskeletal: Muscular strength appears intact.  No joint effusion, tenderness, swelling or warmth  Neuro:  No focal motor or sensory deficits        ASSESSMENT     Patient Active Problem List    Diagnosis Date Noted    S/P MVR (mitral valve replacement) 07/11/2013     Priority: High    Essential hypertension 10/25/2012     Priority: High     Class: Chronic    Major depressive disorder, recurrent episode, moderate ([facility] [address] 10/25/2012     Priority: High     Class: Chronic    Mixed hypercholesterolemia and hypertriglyceridemia 08/30/2012     Priority: High     Class: Chronic    Anticoagulation management encounter 12/22/2016        Diagnosis:     ICD-10-CM    1. S/P MVR (mitral valve replacement) Z95.2 POCT INR   2. Essential hypertension I10    3. Major depressive disorder, recurrent episode, moderate (HCC) F33.1    4. Anticoagulation management encounter Z51.81     Z79.01    5. Mixed hypercholesterolemia and hypertriglyceridemia E78.2        PLAN:           Patient Instructions   LOW SALT,LOW CARB. AND LOW FAT DIET. CONTINUE CURRENT MEDICATIONS TAKING REGULARLY. TAKE COUMADIN 3 MG. 2 TABLETS TODAY AND THEN 4 MG. DAILY STARTING TOMORROW. REGULAR WALKING ADVISED. INR IN 1 WEEK. NEXT APPOINTMENT IN 1 MONTH. Return in about 1 month (around 6/16/2019). I have reviewed my findings and recommendations with [facility] PEÑALOZA.     Electronically signed by Sam Pat MD on 5/16/19 at 3:32 PM

## 2019-05-29 ENCOUNTER — NURSE ONLY (OUTPATIENT)
Dept: FAMILY MEDICINE CLINIC | Age: 84
End: 2019-05-29
Payer: MEDICARE

## 2019-05-29 DIAGNOSIS — Z95.2 S/P MVR (MITRAL VALVE REPLACEMENT): ICD-10-CM

## 2019-05-29 LAB
INTERNATIONAL NORMALIZATION RATIO, POC: 2.7
INTERNATIONAL NORMALIZATION RATIO, POC: 2.7
PROTHROMBIN TIME, POC: 32.5

## 2019-05-29 PROCEDURE — 85610 PROTHROMBIN TIME: CPT | Performed by: FAMILY MEDICINE

## 2019-06-20 ENCOUNTER — OFFICE VISIT (OUTPATIENT)
Dept: FAMILY MEDICINE CLINIC | Age: 84
End: 2019-06-20
Payer: MEDICARE

## 2019-06-20 VITALS
TEMPERATURE: 98.2 F | HEIGHT: 65 IN | WEIGHT: 158 LBS | DIASTOLIC BLOOD PRESSURE: 68 MMHG | HEART RATE: 72 BPM | BODY MASS INDEX: 26.33 KG/M2 | SYSTOLIC BLOOD PRESSURE: 134 MMHG | OXYGEN SATURATION: 93 %

## 2019-06-20 DIAGNOSIS — Z79.01 ANTICOAGULATION MANAGEMENT ENCOUNTER: Primary | ICD-10-CM

## 2019-06-20 DIAGNOSIS — E78.2 MIXED HYPERCHOLESTEROLEMIA AND HYPERTRIGLYCERIDEMIA: ICD-10-CM

## 2019-06-20 DIAGNOSIS — Z51.81 ANTICOAGULATION MANAGEMENT ENCOUNTER: Primary | ICD-10-CM

## 2019-06-20 DIAGNOSIS — I10 ESSENTIAL HYPERTENSION: ICD-10-CM

## 2019-06-20 LAB
INTERNATIONAL NORMALIZATION RATIO, POC: 6.4
PROTHROMBIN TIME, POC: 76.8

## 2019-06-20 PROCEDURE — 85610 PROTHROMBIN TIME: CPT | Performed by: FAMILY MEDICINE

## 2019-06-20 PROCEDURE — 99213 OFFICE O/P EST LOW 20 MIN: CPT | Performed by: FAMILY MEDICINE

## 2019-06-20 NOTE — PROGRESS NOTES
OFFICE PROGRESS NOTE      SUBJECTIVE:        Patient ID:   Irwin Andrews is a 82 y.o. female who presents for   Chief Complaint   Patient presents with   [facility] Office Visit for Anticoagulation Management     INR 6.4, Takes 4mg daily. New bruise on Lt elbow           HPI:     FEELS GOOD. WATCHING DIET GOOD. WALKING FOR EXERCISE. TAKING MEDICATIONS REGULARLY. Prior to Admission medications    Medication Sig Start Date End Date Taking? Authorizing Provider   warfarin (COUMADIN) 4 MG tablet Take 1 tablet by mouth daily 5/16/19  Yes Mindy Rivas MD   ranitidine (ZANTAC) 300 MG tablet take 1 tablet by mouth at bedtime 2/1/19  Yes Historical Provider, MD   Multiple Vitamins-Minerals (PRESERVISION AREDS 2 PO) Take 1 tablet by mouth daily   Yes Historical Provider, MD   sucralfate (CARAFATE) 1 GM tablet Take 1 tablet by mouth 4 times daily for 10 days 1/21/19 6/20/19 Yes PO Roth   dicyclomine (BENTYL) 10 MG capsule Take 1 capsule by mouth 4 times daily as needed (FOR STOMACH ACHE AND NAUSEA.) 1/17/19  Yes Mindy Rivas MD   pantoprazole (PROTONIX) 40 MG tablet Take 1 tablet by mouth daily 1/7/19  Yes Mindy Rivas MD   simvastatin (ZOCOR) 20 MG tablet Take 1 tablet by mouth nightly 1/7/19  Yes Mindy Rivas MD   venlafaxine (EFFEXOR) 37.5 MG tablet Take 37.5 mg by mouth 3 times daily 9/12/17  Yes Historical Provider, MD   LORazepam (ATIVAN) 0.5 MG tablet Take 0.5 mg by mouth 2 times daily as needed for Anxiety.  . 6/15/16  Yes Historical Provider, MD   QUEtiapine (SEROQUEL) 300 MG tablet Take 300 mg by mouth nightly  2/28/16  Yes Historical Provider, MD   ketorolac 0.4 % SOLN ophthalmic solution Place into the right eye daily    Historical Provider, MD   influenza virus trivalent vaccine (FLUZONE) injection Inject 0.5 mLs into the muscle once Given 10/2018 w/ PCP    Historical Provider, MD     Social History     Socioeconomic History    Marital status: incontinence  Musculoskeletal: joint pains off and on. Morning stiffness. Ambulating well  Neurologic: no paralysis, paresis, paresthesia, seizures, tremors, or headaches  Hematologic/Lymphatic/Immunologic: no anemia, abnormal bleeding/bruising, fever, chills, night sweats, swollen glands, or unexplained weight loss  Endocrine: no heat or cold intolerance and no polyphagia, polydipsia, or polyuria        OBJECTIVE:     VS:  Wt Readings from Last 3 Encounters:   06/20/19 158 lb (71.7 kg)   05/16/19 151 lb 8 oz (68.7 kg)   04/15/19 150 lb (68 kg)     Temp Readings from Last 3 Encounters:   06/20/19 98.2 °F (36.8 °C)   05/16/19 97.7 °F (36.5 °C) (Temporal)   04/15/19 97 °F (36.1 °C) (Temporal)     BP Readings from Last 3 Encounters:   06/20/19 134/68   05/16/19 134/64   04/15/19 128/80        General appearance: Alert, Awake, Oriented times 3, no distress  Skin: Warm and dry  Head: Normocephalic. No masses, lesions or tenderness noted  Eyes: Conjunctivae appear normal. PERLE  Ears: External ears normal  Nose/Sinuses: Nares normal. Septum midline. Mucosa normal. No drainage  Oropharynx: Oropharynx clear with no exudate seen  Neck: Neck supple. No jugular venous distension, lymphadenopathy or thyromegaly Trachea midline  Chest:  Normal. Movements are Normal and Equal.  Lungs: Lungs clear to auscultation bilaterally. No ronchi, crackles or wheezes  Heart: S1 S2  Regular rate and rhythm. No rub, murmur or gallop  Abdomen: Abdomen soft, non-tender. BS normal. No masses, organomegaly. Back: Grossly Normal and Equal. DTR are Normal. SLR is Normal.  Extremities: Arthritic changes are noted. Movements are limited. Pedal pulses are normal.  Musculoskeletal: Muscular strength appears intact.  No joint effusion, tenderness, swelling or warmth  Neuro:  No focal motor or sensory deficits        ASSESSMENT     Patient Active Problem List    Diagnosis Date Noted    S/P MVR (mitral valve replacement) 07/11/2013     Priority: High    Essential hypertension 10/25/2012     Priority: High     Class: Chronic    Major depressive disorder, recurrent episode, moderate (HCC) 10/25/2012     Priority: High     Class: Chronic    Mixed hypercholesterolemia and hypertriglyceridemia 08/30/2012     Priority: High     Class: Chronic    Anticoagulation management encounter 12/22/2016        Diagnosis:     ICD-10-CM    1. Anticoagulation management encounter Z51.81 POCT INR    Z79.01    2. Mixed hypercholesterolemia and hypertriglyceridemia E78.2    3. Essential hypertension I10        PLAN:           Patient Instructions   LOW SALT  AND LOW FAT DIET. CONTINUE CURRENT MEDICATIONS TAKING REGULARLY. HOLD COUMADIN FOR 4 DAYS. INR ON Monday. REGULAR WALKING ADVISED. NEXT APPOINTMENT IN 1 MONTH. ANNUAL PHYSICAL EXAMINATION       Return in about 1 month (around 7/20/2019) for [facility]. .         I have reviewed my findings and recommendations with [facility] SHELBIE PEÑALOZA.     Electronically signed by Daryn Valles MD on 6/20/19 at 2:11 PM

## 2019-06-24 ENCOUNTER — OFFICE VISIT (OUTPATIENT)
Dept: FAMILY MEDICINE CLINIC | Age: 84
End: 2019-06-24
Payer: MEDICARE

## 2019-06-24 VITALS
RESPIRATION RATE: 16 BRPM | HEIGHT: 64 IN | OXYGEN SATURATION: 95 % | HEART RATE: 73 BPM | DIASTOLIC BLOOD PRESSURE: 70 MMHG | TEMPERATURE: 97.3 F | SYSTOLIC BLOOD PRESSURE: 132 MMHG | BODY MASS INDEX: 26 KG/M2 | WEIGHT: 152.3 LBS

## 2019-06-24 DIAGNOSIS — Z51.81 ANTICOAGULATION MANAGEMENT ENCOUNTER: Primary | ICD-10-CM

## 2019-06-24 DIAGNOSIS — Z00.00 ROUTINE GENERAL MEDICAL EXAMINATION AT A HEALTH CARE FACILITY: ICD-10-CM

## 2019-06-24 DIAGNOSIS — Z79.01 ANTICOAGULATION MANAGEMENT ENCOUNTER: Primary | ICD-10-CM

## 2019-06-24 DIAGNOSIS — Z95.2 S/P MVR (MITRAL VALVE REPLACEMENT): ICD-10-CM

## 2019-06-24 DIAGNOSIS — I10 ESSENTIAL HYPERTENSION: ICD-10-CM

## 2019-06-24 DIAGNOSIS — E78.2 MIXED HYPERCHOLESTEROLEMIA AND HYPERTRIGLYCERIDEMIA: ICD-10-CM

## 2019-06-24 LAB
INTERNATIONAL NORMALIZATION RATIO, POC: 1.3
PROTHROMBIN TIME, POC: 15.1

## 2019-06-24 PROCEDURE — G0439 PPPS, SUBSEQ VISIT: HCPCS | Performed by: FAMILY MEDICINE

## 2019-06-24 PROCEDURE — 85610 PROTHROMBIN TIME: CPT | Performed by: FAMILY MEDICINE

## 2019-06-24 ASSESSMENT — PATIENT HEALTH QUESTIONNAIRE - PHQ9
SUM OF ALL RESPONSES TO PHQ QUESTIONS 1-9: 0
SUM OF ALL RESPONSES TO PHQ QUESTIONS 1-9: 0

## 2019-06-24 ASSESSMENT — ANXIETY QUESTIONNAIRES: GAD7 TOTAL SCORE: 0

## 2019-06-24 ASSESSMENT — LIFESTYLE VARIABLES: HOW OFTEN DO YOU HAVE A DRINK CONTAINING ALCOHOL: 0

## 2019-06-24 NOTE — PATIENT INSTRUCTIONS
LOW SALT  AND LOW FAT DIET. CONTINUE CURRENT MEDICATIONS TAKING REGULARLY. TAKE COUMADIN 4 MG. TODAY AND THEN 3 MG. DAILY. REGULAR WALKING ADVISED. NEXT APPOINTMENT IN 1 MONTH. Personalized Preventive Plan for [facility] SHELBI[facility] PEÑALOZA - 6/24/2019  Medicare offers a range of preventive health benefits. Some of the tests and screenings are paid in full while other may be subject to a deductible, co-insurance, and/or copay. Some of these benefits include a comprehensive review of your medical history including lifestyle, illnesses that may run in your family, and various assessments and screenings as appropriate. After reviewing your medical record and screening and assessments performed today your provider may have ordered immunizations, labs, imaging, and/or referrals for you. A list of these orders (if applicable) as well as your Preventive Care list are included within your After Visit Summary for your review. Other Preventive Recommendations:    · A preventive eye exam performed by an eye specialist is recommended every 1-2 years to screen for glaucoma; cataracts, macular degeneration, and other eye disorders. · A preventive dental visit is recommended every 6 months. · Try to get at least 150 minutes of exercise per week or 10,000 steps per day on a pedometer . · Order or download the FREE \"Exercise & Physical Activity: Your Everyday Guide\" from [employer]. Call 5-368.756.9680 or search [employer] online. · You need 9535-4649 mg of calcium and 2140-3303 IU of vitamin D per day. It is possible to meet your calcium requirement with diet alone, but a vitamin D supplement is usually necessary to meet this goal.  · When exposed to the sun, use a sunscreen that protects against both UVA and UVB radiation with an SPF of 30 or greater. Reapply every 2 to 3 hours or after sweating, drying off with a towel, or swimming.   · Always wear a seat belt when traveling in a car. Always wear a helmet when riding a bicycle or motorcycle.

## 2019-06-24 NOTE — PROGRESS NOTES
Medicare Annual Wellness Visit  Name: Tyrell Mello Date: 2019   MRN: <[id number]> Sex: Female   Age: 82 y.o. Ethnicity: Non-/Non    : 1935 Race: Flor Barros is here for Medicare AWV (pt/inr)    Screenings for behavioral, psychosocial and functional/safety risks, and cognitive dysfunction are all negative except as indicated below. These results, as well as other patient data from the [address] form, are documented in Flowsheets linked to this Encounter. No Known Allergies  Prior to Visit Medications    Medication Sig Taking? Authorizing Provider   warfarin (COUMADIN) 4 MG tablet Take 1 tablet by mouth daily Yes Slade Portillo MD   ranitidine (ZANTAC) 300 MG tablet take 1 tablet by mouth at bedtime Yes Historical Provider, MD   ketorolac 0.4 % SOLN ophthalmic solution Place into the right eye daily Yes Historical Provider, MD   Multiple Vitamins-Minerals (PRESERVISION AREDS 2 PO) Take 1 tablet by mouth daily Yes Historical Provider, MD   influenza virus trivalent vaccine (FLUZONE) injection Inject 0.5 mLs into the muscle once Given 10/2018 w/ PCP Yes Historical Provider, MD   dicyclomine (BENTYL) 10 MG capsule Take 1 capsule by mouth 4 times daily as needed (FOR STOMACH ACHE AND NAUSEA.) Yes Slade Portillo MD   pantoprazole (PROTONIX) 40 MG tablet Take 1 tablet by mouth daily Yes Slade Portillo MD   simvastatin (ZOCOR) 20 MG tablet Take 1 tablet by mouth nightly Yes Slade Portillo MD   venlafaxine (EFFEXOR) 37.5 MG tablet Take 37.5 mg by mouth 3 times daily Yes Historical Provider, MD   LORazepam (ATIVAN) 0.5 MG tablet Take 0.5 mg by mouth 2 times daily as needed for Anxiety.  . Yes Historical Provider, MD   QUEtiapine (SEROQUEL) 300 MG tablet Take 300 mg by mouth nightly  Yes Historical Provider, MD   sucralfate (CARAFATE) 1 GM tablet Take 1 tablet by mouth 4 times daily for 10 days  Gary Wright     Past Medical History:   Diagnosis Date    CHF (congestive heart failure) ([facility] [address]     9 years ago    Difficult intubation     needs a small tube    Kidney stones 2002     Past Surgical History:   Procedure Laterality Date    CARDIAC SURGERY  01/08/2007    CARDIAC VALVE REPLACEMENT  01/07/07    BAD MITRAL VALVE    CATARACT REMOVAL WITH IMPLANT Right 12/10/13    CATARACT REMOVAL WITH IMPLANT Left 05 10 2016    Cataract extraction  intraoccular lens implant left eye    COLONOSCOPY      KIDNEY STONE SURGERY      PACEMAKER INSERTION Left     PACEMAKER PLACEMENT       Family History   Problem Relation Age of Onset    Mental Illness Maternal Grandmother     Alcohol Abuse Father        CareTeam (Including outside providers/suppliers regularly involved in providing care):   Patient Care Team:  Chanel Smith MD as PCP - General (Family Medicine)  Chanel mSith [facility] as PCP [facility]    Wt Readings from Last 3 Encounters:   06/24/19 152 lb 4.8 oz (69.1 kg)   06/20/19 158 lb (71.7 kg)   05/16/19 151 lb 8 oz (68.7 kg)     Vitals:    06/24/19 1349   BP: 132/70   Pulse: 73   Resp: 16   Temp: 97.3 °F (36.3 °C)   TempSrc: Temporal   SpO2: 95%   Weight: 152 lb 4.8 oz (69.1 kg)   Height: 5' 4\" (1.626 m)     Body mass index is 26.14 kg/m². Based upon direct observation of the patient, evaluation of cognition reveals recent and remote memory intact.     General Appearance: alert and oriented to person, place and time, well developed and well- nourished, in no acute distress  Skin: warm and dry, no rash or erythema  Head: normocephalic and atraumatic  Eyes: pupils equal, round, and reactive to light, extraocular eye movements intact, conjunctivae normal  ENT: tympanic membrane, external ear and ear canal normal bilaterally, nose without deformity, nasal mucosa and turbinates normal without polyps  Neck: supple and non-tender without mass, no thyromegaly or thyroid nodules, no cervical lymphadenopathy  Pulmonary/Chest: clear to auscultation bilaterally- no wheezes, rales or rhonchi, normal air movement, no respiratory distress  Cardiovascular: normal rate, regular rhythm, normal S1 and S2, no murmurs, rubs, clicks, or gallops, distal pulses intact, no carotid bruits  Abdomen: soft, non-tender, non-distended, normal bowel sounds, no masses or organomegaly  Extremities: no cyanosis, clubbing or edema  Musculoskeletal: normal range of motion, no joint swelling, deformity or tenderness  Neurologic: reflexes normal and symmetric, no cranial nerve deficit, gait, coordination and speech normal    Patient's complete Health Risk Assessment and screening values have been reviewed and are found in Flowsheets. The following problems were reviewed today and where indicated follow up appointments were made and/or referrals ordered. Positive Risk Factor Screenings with Interventions:     Cognitive: Words recalled: 2 Words Recalled  Total Score Interpretation: Positive Mini-Cog  Did the patient refuse to take the cognition test?: No  Cognitive Impairment Interventions:  · PATIENT ABLE TO RECALL WORDS WELL, NOT FORGETFUL AT HOME AS PER . Health Habits/Nutrition:  Health Habits/Nutrition  Do you exercise for at least 20 minutes 2-3 times per week?: (!) No  Have you lost any weight without trying in the past 3 months?: No  Do you eat fewer than 2 meals per day?: (!) Yes  Have you seen a dentist within the past year?: Yes  Body mass index is 26.14 kg/m².   Health Habits/Nutrition Interventions:  · Inadequate physical activity:  educational materials provided to promote increased physical activity  · Nutritional issues:  educational materials for healthy, well-balanced diet provided    Hearing/Vision:  Hearing/Vision  Do you or your family notice any trouble with your hearing?: (!) Yes  Do you have difficulty driving, watching TV, or doing any of your daily activities because of your eyesight?: No  Have you had an eye exam within the past year?: Yes  Hearing/Vision Interventions:  · DOES WEAR HEARING AID. Personalized Preventive Plan   Current Health Maintenance Status  Immunization History   Administered Date(s) Administered    DT (pediatric) 08/14/2002    Influenza Vaccine, unspecified formulation 08/12/2016    Influenza Virus Vaccine 10/04/2012, 10/15/2013    Influenza, High Dose (Fluzone 65 yrs and older) 08/09/2017, 10/11/2018    Pneumococcal Conjugate 13-valent ([employer]) 03/09/2017, 08/09/2017    Pneumococcal Polysaccharide ([id number]) 08/14/2007, 10/25/2012        Health Maintenance   Topic Date Due    Shingles Vaccine (1 of 2) 09/21/1987    DTaP/Tdap/Td vaccine (2 - Tdap) 08/14/2012    Annual Wellness Visit (AWV)  06/05/2019    Potassium monitoring  02/12/2020    Creatinine monitoring  02/12/2020    Flu vaccine  Completed    Pneumococcal 65+ years Vaccine  Completed    DEXA (modify frequency per FRAX score)  Addressed     Recommendations for Preventive Services Due: see orders and patient instructions/AVS.  .   Recommended screening schedule for the next 5-10 years is provided to the patient in written form: see Patient Instructions/AVS.

## 2019-07-01 RX ORDER — WARFARIN SODIUM 3 MG/1
3 TABLET ORAL DAILY
Qty: 30 TABLET | Refills: 3 | Status: SHIPPED | OUTPATIENT
Start: 2019-07-01 | End: 2019-10-08 | Stop reason: DRUGHIGH

## 2019-07-29 ENCOUNTER — OFFICE VISIT (OUTPATIENT)
Dept: FAMILY MEDICINE CLINIC | Age: 84
End: 2019-07-29
Payer: MEDICARE

## 2019-07-29 VITALS
SYSTOLIC BLOOD PRESSURE: 136 MMHG | TEMPERATURE: 97.1 F | HEIGHT: 65 IN | RESPIRATION RATE: 16 BRPM | WEIGHT: 151.7 LBS | BODY MASS INDEX: 25.27 KG/M2 | OXYGEN SATURATION: 96 % | DIASTOLIC BLOOD PRESSURE: 68 MMHG | HEART RATE: 72 BPM

## 2019-07-29 DIAGNOSIS — I10 ESSENTIAL HYPERTENSION: ICD-10-CM

## 2019-07-29 DIAGNOSIS — Z95.2 S/P MVR (MITRAL VALVE REPLACEMENT): ICD-10-CM

## 2019-07-29 DIAGNOSIS — E78.2 MIXED HYPERCHOLESTEROLEMIA AND HYPERTRIGLYCERIDEMIA: Primary | ICD-10-CM

## 2019-07-29 LAB
INTERNATIONAL NORMALIZATION RATIO, POC: 2.1
PROTHROMBIN TIME, POC: 25.7

## 2019-07-29 PROCEDURE — 85610 PROTHROMBIN TIME: CPT | Performed by: FAMILY MEDICINE

## 2019-07-29 PROCEDURE — 99213 OFFICE O/P EST LOW 20 MIN: CPT | Performed by: FAMILY MEDICINE

## 2019-07-29 ASSESSMENT — PATIENT HEALTH QUESTIONNAIRE - PHQ9
SUM OF ALL RESPONSES TO PHQ QUESTIONS 1-9: 0
SUM OF ALL RESPONSES TO PHQ9 QUESTIONS 1 & 2: 0
2. FEELING DOWN, DEPRESSED OR HOPELESS: 0
1. LITTLE INTEREST OR PLEASURE IN DOING THINGS: 0
SUM OF ALL RESPONSES TO PHQ QUESTIONS 1-9: 0

## 2019-07-29 NOTE — PROGRESS NOTES
OFFICE PROGRESS NOTE      SUBJECTIVE:        Patient ID:   Erika Freeman is a 82 y.o. female who presents for   Chief Complaint   Patient presents with   [facility] Office Visit for Anticoagulation Management     2.1 / 25.7           HPI:     FEELS GOOD. WATCHING DIET GOOD. NO  EXERCISE. TAKING MEDICATIONS REGULARLY. Prior to Admission medications    Medication Sig Start Date End Date Taking? Authorizing Provider   warfarin (COUMADIN) 3 MG tablet Take 1 tablet by mouth daily 7/1/19  Yes Florinda Nova MD   warfarin (COUMADIN) 4 MG tablet Take 1 tablet by mouth daily 5/16/19  Yes Florinda Nova MD   ranitidine (ZANTAC) 300 MG tablet take 1 tablet by mouth at bedtime 2/1/19  Yes Historical Provider, MD   ketorolac 0.4 % SOLN ophthalmic solution Place into the right eye daily   Yes Historical Provider, MD   Multiple Vitamins-Minerals (PRESERVISION AREDS 2 PO) Take 1 tablet by mouth daily   Yes Historical Provider, MD   influenza virus trivalent vaccine (FLUZONE) injection Inject 0.5 mLs into the muscle once Given 10/2018 w/ PCP   Yes Historical Provider, MD   dicyclomine (BENTYL) 10 MG capsule Take 1 capsule by mouth 4 times daily as needed (FOR STOMACH ACHE AND NAUSEA.) 1/17/19  Yes Florinda Nova MD   pantoprazole (PROTONIX) 40 MG tablet Take 1 tablet by mouth daily 1/7/19  Yes Florinda Nova MD   simvastatin (ZOCOR) 20 MG tablet Take 1 tablet by mouth nightly 1/7/19  Yes Florinda Nova MD   venlafaxine (EFFEXOR) 37.5 MG tablet Take 37.5 mg by mouth 3 times daily 9/12/17  Yes Historical Provider, MD   LORazepam (ATIVAN) 0.5 MG tablet Take 0.5 mg by mouth 2 times daily as needed for Anxiety.  . 6/15/16  Yes Historical Provider, MD   QUEtiapine (SEROQUEL) 300 MG tablet Take 300 mg by mouth nightly  2/28/16  Yes Historical Provider, MD   sucralfate (CARAFATE) 1 GM tablet Take 1 tablet by mouth 4 times daily for 10 days 1/21/19 6/20/19  Kam Huntley urinary urgency, frequency, dysuria, nocturia, hesitancy, or incontinence  Musculoskeletal: joint pains off and on. Morning stiffness. Ambulating well  Neurologic: no paralysis, paresis, paresthesia, seizures, tremors, or headaches  Hematologic/Lymphatic/Immunologic: no anemia, abnormal bleeding/bruising, fever, chills, night sweats, swollen glands, or unexplained weight loss  Endocrine: no heat or cold intolerance and no polyphagia, polydipsia, or polyuria        OBJECTIVE:     VS:  Wt Readings from Last 3 Encounters:   07/29/19 151 lb 11.2 oz (68.8 kg)   06/24/19 152 lb 4.8 oz (69.1 kg)   06/20/19 158 lb (71.7 kg)     Temp Readings from Last 3 Encounters:   07/29/19 97.1 °F (36.2 °C) (Temporal)   06/24/19 97.3 °F (36.3 °C) (Temporal)   06/20/19 98.2 °F (36.8 °C)     BP Readings from Last 3 Encounters:   07/29/19 136/68   06/24/19 132/70   06/20/19 134/68        General appearance: Alert, Awake, Oriented times 3, no distress  Skin: Warm and dry  Head: Normocephalic. No masses, lesions or tenderness noted  Eyes: Conjunctivae appear normal. PERLE  Ears: External ears normal  Nose/Sinuses: Nares normal. Septum midline. Mucosa normal. No drainage  Oropharynx: Oropharynx clear with no exudate seen  Neck: Neck supple. No jugular venous distension, lymphadenopathy or thyromegaly Trachea midline  Chest:  Normal. Movements are Normal and Equal.  Lungs: Lungs clear to auscultation bilaterally. No ronchi, crackles or wheezes  Heart: S1 S2  Regular rate and rhythm. No rub, murmur or gallop  Abdomen: Abdomen soft, non-tender. BS normal. No masses, organomegaly. Back: Grossly Normal and Equal. DTR are Normal. SLR is Normal.  Extremities: Arthritic changes are noted. Movements are limited. Pedal pulses are normal.  Musculoskeletal: Muscular strength appears intact.  No joint effusion, tenderness, swelling or warmth  Neuro:  No focal motor or sensory deficits        ASSESSMENT     Patient Active Problem List    Diagnosis Date

## 2019-07-29 NOTE — PATIENT INSTRUCTIONS
LOW SALT,LOW CARB. AND LOW FAT DIET. CONTINUE CURRENT MEDICATIONS TAKING REGULARLY. CONTINUE COUMADIN SAME DOSE. REGULAR WALKING ADVISED. NEXT APPOINTMENT IN 1 MONTH.

## 2019-08-29 ENCOUNTER — OFFICE VISIT (OUTPATIENT)
Dept: FAMILY MEDICINE CLINIC | Age: 84
End: 2019-08-29
Payer: MEDICARE

## 2019-08-29 ENCOUNTER — ANTI-COAG VISIT (OUTPATIENT)
Dept: FAMILY MEDICINE CLINIC | Age: 84
End: 2019-08-29

## 2019-08-29 VITALS
HEIGHT: 65 IN | OXYGEN SATURATION: 98 % | HEART RATE: 91 BPM | DIASTOLIC BLOOD PRESSURE: 64 MMHG | BODY MASS INDEX: 25.41 KG/M2 | TEMPERATURE: 97.5 F | WEIGHT: 152.5 LBS | SYSTOLIC BLOOD PRESSURE: 138 MMHG

## 2019-08-29 DIAGNOSIS — I10 ESSENTIAL HYPERTENSION: ICD-10-CM

## 2019-08-29 DIAGNOSIS — Z95.2 S/P MVR (MITRAL VALVE REPLACEMENT): ICD-10-CM

## 2019-08-29 DIAGNOSIS — Z79.01 ANTICOAGULATION MANAGEMENT ENCOUNTER: Primary | ICD-10-CM

## 2019-08-29 DIAGNOSIS — E78.2 MIXED HYPERCHOLESTEROLEMIA AND HYPERTRIGLYCERIDEMIA: ICD-10-CM

## 2019-08-29 DIAGNOSIS — Z51.81 ANTICOAGULATION MANAGEMENT ENCOUNTER: Primary | ICD-10-CM

## 2019-08-29 LAB
INTERNATIONAL NORMALIZATION RATIO, POC: 1.5
PROTHROMBIN TIME, POC: 17.6

## 2019-08-29 PROCEDURE — 99213 OFFICE O/P EST LOW 20 MIN: CPT | Performed by: FAMILY MEDICINE

## 2019-08-29 PROCEDURE — 85610 PROTHROMBIN TIME: CPT | Performed by: FAMILY MEDICINE

## 2019-08-29 NOTE — PROGRESS NOTES
once Given 10/2018 w/ PCP    Historical Provider, MD     Social History     Socioeconomic History    Marital status:      Spouse name: Not on file    Number of children: Not on file    Years of education: Not on file    Highest education level: Not on file   Occupational History    Not on file   Social Needs    Financial resource strain: Not on file    Food insecurity:     Worry: Not on file     Inability: Not on file    Transportation needs:     Medical: Not on file     Non-medical: Not on file   Tobacco Use    Smoking status: Never Smoker    Smokeless tobacco: Never Used   Substance and Sexual Activity    Alcohol use: No     Comment: RARELY    Drug use: No    Sexual activity: Never   Lifestyle    Physical activity:     Days per week: Not on file     Minutes per session: Not on file    Stress: Not on file   Relationships    Social connections:     Talks on phone: Not on file     Gets together: Not on file     Attends Hinduism service: Not on file     Active member of club or organization: Not on file     Attends meetings of clubs or organizations: Not on file     Relationship status: Not on file    Intimate partner violence:     Fear of current or ex partner: Not on file     Emotionally abused: Not on file     Physically abused: Not on file     Forced sexual activity: Not on file   Other Topics Concern    Not on file   Social History Narrative    Not on file       I have reviewed Zacks allergies, medications, problem list, medical, social and family history and have updated as needed in the electronic medical record  Review Of Systems:    Skin: no abnormal pigmentation, rash, scaling, itching, masses, hair or nail changes  Eyes: no blurring, diplopia, or eye pain  Ears/Nose/Throat: no hearing loss, tinnitus, vertigo, nosebleed, nasal congestion, rhinorrhea, sore throat  Respiratory: no cough, pleuritic chest pain, dyspnea, or wheezing  Cardiovascular: no chest pain, angina, dyspnea

## 2019-09-04 ENCOUNTER — NURSE ONLY (OUTPATIENT)
Dept: FAMILY MEDICINE CLINIC | Age: 84
End: 2019-09-04
Payer: MEDICARE

## 2019-09-04 DIAGNOSIS — Z95.2 S/P MVR (MITRAL VALVE REPLACEMENT): ICD-10-CM

## 2019-09-04 LAB
INTERNATIONAL NORMALIZATION RATIO, POC: 1.6
PROTHROMBIN TIME, POC: 19.5

## 2019-09-04 PROCEDURE — 85610 PROTHROMBIN TIME: CPT | Performed by: FAMILY MEDICINE

## 2019-10-01 ENCOUNTER — HOSPITAL ENCOUNTER (OUTPATIENT)
Age: 84
Discharge: HOME | End: 2019-10-03
Payer: MEDICARE

## 2019-10-01 DIAGNOSIS — E78.2 MIXED HYPERCHOLESTEROLEMIA AND HYPERTRIGLYCERIDEMIA: ICD-10-CM

## 2019-10-01 LAB
ALBUMIN SERPL-MCNC: 4.1 G/DL (ref 3.5–5.2)
ALP BLD-CCNC: 72 U/L (ref 35–104)
ALT SERPL-CCNC: 14 U/L (ref 0–32)
ANION GAP SERPL CALCULATED.3IONS-SCNC: 13 MMOL/L (ref 7–16)
AST SERPL-CCNC: 19 U/L (ref 0–31)
BILIRUB SERPL-MCNC: 0.3 MG/DL (ref 0–1.2)
BUN BLDV-MCNC: 15 MG/DL (ref 8–23)
CALCIUM SERPL-MCNC: 10.1 MG/DL (ref 8.6–10.2)
CHLORIDE BLD-SCNC: 105 MMOL/L (ref 98–107)
CHOLESTEROL, TOTAL: 197 MG/DL (ref 0–199)
CO2: 25 MMOL/L (ref 22–29)
CREAT SERPL-MCNC: 0.8 MG/DL (ref 0.5–1)
GFR AFRICAN AMERICAN: >60
GFR NON-AFRICAN AMERICAN: >60 ML/MIN/1.73
GLUCOSE BLD-MCNC: 98 MG/DL (ref 74–99)
HDLC SERPL-MCNC: 48 MG/DL
LDL CHOLESTEROL CALCULATED: 109 MG/DL (ref 0–99)
POTASSIUM SERPL-SCNC: 4.7 MMOL/L (ref 3.5–5)
SODIUM BLD-SCNC: 143 MMOL/L (ref 132–146)
TOTAL PROTEIN: 7.3 G/DL (ref 6.4–8.3)
TRIGL SERPL-MCNC: 202 MG/DL (ref 0–149)
VLDLC SERPL CALC-MCNC: 40 MG/DL

## 2019-10-01 PROCEDURE — 36415 COLL VENOUS BLD VENIPUNCTURE: CPT

## 2019-10-01 PROCEDURE — 80061 LIPID PANEL: CPT

## 2019-10-01 PROCEDURE — 80053 COMPREHEN METABOLIC PANEL: CPT

## 2019-10-08 ENCOUNTER — OFFICE VISIT (OUTPATIENT)
Dept: FAMILY MEDICINE CLINIC | Age: 84
End: 2019-10-08
Payer: MEDICARE

## 2019-10-08 VITALS
RESPIRATION RATE: 16 BRPM | OXYGEN SATURATION: 97 % | SYSTOLIC BLOOD PRESSURE: 144 MMHG | DIASTOLIC BLOOD PRESSURE: 68 MMHG | BODY MASS INDEX: 26.5 KG/M2 | HEIGHT: 64 IN | WEIGHT: 155.2 LBS | HEART RATE: 73 BPM | TEMPERATURE: 97.5 F

## 2019-10-08 DIAGNOSIS — Z51.81 ANTICOAGULATION MANAGEMENT ENCOUNTER: Primary | ICD-10-CM

## 2019-10-08 DIAGNOSIS — Z79.01 ANTICOAGULATION MANAGEMENT ENCOUNTER: Primary | ICD-10-CM

## 2019-10-08 DIAGNOSIS — Z95.2 S/P MVR (MITRAL VALVE REPLACEMENT): ICD-10-CM

## 2019-10-08 DIAGNOSIS — E78.2 MIXED HYPERCHOLESTEROLEMIA AND HYPERTRIGLYCERIDEMIA: ICD-10-CM

## 2019-10-08 LAB
INTERNATIONAL NORMALIZATION RATIO, POC: 1.4
PROTHROMBIN TIME, POC: 16.2

## 2019-10-08 PROCEDURE — 85610 PROTHROMBIN TIME: CPT | Performed by: FAMILY MEDICINE

## 2019-10-08 PROCEDURE — 99213 OFFICE O/P EST LOW 20 MIN: CPT | Performed by: FAMILY MEDICINE

## 2019-10-08 RX ORDER — WARFARIN SODIUM 3 MG/1
3 TABLET ORAL DAILY
Qty: 30 TABLET | Refills: 3 | Status: CANCELLED | OUTPATIENT
Start: 2019-10-08

## 2019-10-11 RX ORDER — WARFARIN SODIUM 4 MG/1
4 TABLET ORAL DAILY
Qty: 30 TABLET | Refills: 3 | OUTPATIENT
Start: 2019-10-11

## 2019-10-11 RX ORDER — WARFARIN SODIUM 4 MG/1
4 TABLET ORAL DAILY
Qty: 30 TABLET | Refills: 3 | Status: SHIPPED | OUTPATIENT
Start: 2019-10-11 | End: 2020-02-04 | Stop reason: SDUPTHER

## 2019-11-26 ENCOUNTER — OFFICE VISIT (OUTPATIENT)
Dept: FAMILY MEDICINE CLINIC | Age: 84
End: 2019-11-26
Payer: MEDICARE

## 2019-11-26 VITALS
HEIGHT: 64 IN | WEIGHT: 152 LBS | BODY MASS INDEX: 25.95 KG/M2 | SYSTOLIC BLOOD PRESSURE: 136 MMHG | OXYGEN SATURATION: 97 % | DIASTOLIC BLOOD PRESSURE: 78 MMHG | HEART RATE: 70 BPM

## 2019-11-26 DIAGNOSIS — Z12.39 ENCOUNTER FOR SCREENING FOR MALIGNANT NEOPLASM OF BREAST: ICD-10-CM

## 2019-11-26 DIAGNOSIS — Z12.31 VISIT FOR SCREENING MAMMOGRAM: ICD-10-CM

## 2019-11-26 DIAGNOSIS — I10 ESSENTIAL HYPERTENSION: ICD-10-CM

## 2019-11-26 DIAGNOSIS — Z95.2 S/P MVR (MITRAL VALVE REPLACEMENT): Primary | ICD-10-CM

## 2019-11-26 DIAGNOSIS — Z51.81 ANTICOAGULATION MANAGEMENT ENCOUNTER: ICD-10-CM

## 2019-11-26 DIAGNOSIS — Z12.31 ENCOUNTER FOR SCREENING MAMMOGRAM FOR MALIGNANT NEOPLASM OF BREAST: ICD-10-CM

## 2019-11-26 DIAGNOSIS — Z79.01 ANTICOAGULATION MANAGEMENT ENCOUNTER: ICD-10-CM

## 2019-11-26 LAB — INTERNATIONAL NORMALIZATION RATIO, POC: 1.5

## 2019-11-26 PROCEDURE — 85610 PROTHROMBIN TIME: CPT | Performed by: FAMILY MEDICINE

## 2019-11-26 PROCEDURE — 99213 OFFICE O/P EST LOW 20 MIN: CPT | Performed by: FAMILY MEDICINE

## 2019-11-26 RX ORDER — FAMOTIDINE 20 MG/1
TABLET, FILM COATED ORAL
Refills: 1 | COMMUNITY
Start: 2019-11-10

## 2019-12-09 ENCOUNTER — NURSE TRIAGE (OUTPATIENT)
Dept: OTHER | Facility: CLINIC | Age: 84
End: 2019-12-09

## 2019-12-09 ENCOUNTER — APPOINTMENT (OUTPATIENT)
Dept: CT IMAGING | Age: 84
End: 2019-12-09
Payer: MEDICARE

## 2019-12-09 ENCOUNTER — HOSPITAL ENCOUNTER (EMERGENCY)
Age: 84
Discharge: HOME | End: 2019-12-09
Payer: MEDICARE

## 2019-12-09 ENCOUNTER — HOSPITAL ENCOUNTER (EMERGENCY)
Age: 84
Discharge: HOME | End: 2019-12-09
Attending: EMERGENCY MEDICINE
Payer: MEDICARE

## 2019-12-09 ENCOUNTER — TELEPHONE (OUTPATIENT)
Dept: ADMINISTRATIVE | Age: 84
End: 2019-12-09

## 2019-12-09 VITALS
WEIGHT: 150 LBS | BODY MASS INDEX: 24.96 KG/M2 | RESPIRATION RATE: 16 BRPM | HEART RATE: 73 BPM | SYSTOLIC BLOOD PRESSURE: 160 MMHG | TEMPERATURE: 98.2 F | DIASTOLIC BLOOD PRESSURE: 85 MMHG | OXYGEN SATURATION: 96 %

## 2019-12-09 VITALS
DIASTOLIC BLOOD PRESSURE: 92 MMHG | RESPIRATION RATE: 16 BRPM | WEIGHT: 150 LBS | HEIGHT: 65 IN | HEART RATE: 76 BPM | BODY MASS INDEX: 24.99 KG/M2 | SYSTOLIC BLOOD PRESSURE: 144 MMHG | OXYGEN SATURATION: 94 % | TEMPERATURE: 98 F

## 2019-12-09 DIAGNOSIS — R42 DIZZINESS: Primary | ICD-10-CM

## 2019-12-09 DIAGNOSIS — H83.09 LABYRINTHITIS, UNSPECIFIED LATERALITY: ICD-10-CM

## 2019-12-09 LAB
ANION GAP SERPL CALCULATED.3IONS-SCNC: 12 MMOL/L (ref 7–16)
BACTERIA: ABNORMAL /HPF
BASOPHILS ABSOLUTE: 0.03 E9/L (ref 0–0.2)
BASOPHILS RELATIVE PERCENT: 0.3 % (ref 0–2)
BILIRUBIN URINE: NEGATIVE
BLOOD, URINE: NEGATIVE
BUN BLDV-MCNC: 15 MG/DL (ref 8–23)
CALCIUM SERPL-MCNC: 10.3 MG/DL (ref 8.6–10.2)
CHLORIDE BLD-SCNC: 103 MMOL/L (ref 98–107)
CLARITY: CLEAR
CO2: 22 MMOL/L (ref 22–29)
COLOR: YELLOW
CREAT SERPL-MCNC: 0.6 MG/DL (ref 0.5–1)
EOSINOPHILS ABSOLUTE: 0.17 E9/L (ref 0.05–0.5)
EOSINOPHILS RELATIVE PERCENT: 2 % (ref 0–6)
EPITHELIAL CELLS, UA: ABNORMAL /HPF
GFR AFRICAN AMERICAN: >60
GFR NON-AFRICAN AMERICAN: >60 ML/MIN/1.73
GLUCOSE BLD-MCNC: 108 MG/DL (ref 74–99)
GLUCOSE URINE: NEGATIVE MG/DL
HCT VFR BLD CALC: 44.4 % (ref 34–48)
HEMOGLOBIN: 14.9 G/DL (ref 11.5–15.5)
IMMATURE GRANULOCYTES #: 0.04 E9/L
IMMATURE GRANULOCYTES %: 0.5 % (ref 0–5)
INR BLD: 1.8
KETONES, URINE: NEGATIVE MG/DL
LEUKOCYTE ESTERASE, URINE: ABNORMAL
LYMPHOCYTES ABSOLUTE: 1.43 E9/L (ref 1.5–4)
LYMPHOCYTES RELATIVE PERCENT: 16.5 % (ref 20–42)
MCH RBC QN AUTO: 31.6 PG (ref 26–35)
MCHC RBC AUTO-ENTMCNC: 33.6 % (ref 32–34.5)
MCV RBC AUTO: 94.3 FL (ref 80–99.9)
MONOCYTES ABSOLUTE: 0.87 E9/L (ref 0.1–0.95)
MONOCYTES RELATIVE PERCENT: 10 % (ref 2–12)
NEUTROPHILS ABSOLUTE: 6.12 E9/L (ref 1.8–7.3)
NEUTROPHILS RELATIVE PERCENT: 70.7 % (ref 43–80)
NITRITE, URINE: NEGATIVE
PDW BLD-RTO: 13.1 FL (ref 11.5–15)
PH UA: 5.5 (ref 5–9)
PLATELET # BLD: 242 E9/L (ref 130–450)
PMV BLD AUTO: 9.8 FL (ref 7–12)
POTASSIUM SERPL-SCNC: 4.6 MMOL/L (ref 3.5–5)
PROTEIN UA: NEGATIVE MG/DL
PROTHROMBIN TIME: 20.7 SEC (ref 9.3–12.4)
RBC # BLD: 4.71 E12/L (ref 3.5–5.5)
RBC UA: ABNORMAL /HPF (ref 0–2)
SODIUM BLD-SCNC: 137 MMOL/L (ref 132–146)
SPECIFIC GRAVITY UA: 1.01 (ref 1–1.03)
TROPONIN: <0.01 NG/ML (ref 0–0.03)
UROBILINOGEN, URINE: 0.2 E.U./DL
WBC # BLD: 8.7 E9/L (ref 4.5–11.5)
WBC UA: ABNORMAL /HPF (ref 0–5)

## 2019-12-09 PROCEDURE — 93005 ELECTROCARDIOGRAM TRACING: CPT | Performed by: EMERGENCY MEDICINE

## 2019-12-09 PROCEDURE — 85610 PROTHROMBIN TIME: CPT

## 2019-12-09 PROCEDURE — 96374 THER/PROPH/DIAG INJ IV PUSH: CPT

## 2019-12-09 PROCEDURE — 99284 EMERGENCY DEPT VISIT MOD MDM: CPT

## 2019-12-09 PROCEDURE — 80048 BASIC METABOLIC PNL TOTAL CA: CPT

## 2019-12-09 PROCEDURE — 96375 TX/PRO/DX INJ NEW DRUG ADDON: CPT

## 2019-12-09 PROCEDURE — 36415 COLL VENOUS BLD VENIPUNCTURE: CPT

## 2019-12-09 PROCEDURE — 84484 ASSAY OF TROPONIN QUANT: CPT

## 2019-12-09 PROCEDURE — 99212 OFFICE O/P EST SF 10 MIN: CPT

## 2019-12-09 PROCEDURE — 85025 COMPLETE CBC W/AUTO DIFF WBC: CPT

## 2019-12-09 PROCEDURE — 6360000002 HC RX W HCPCS: Performed by: EMERGENCY MEDICINE

## 2019-12-09 PROCEDURE — 6370000000 HC RX 637 (ALT 250 FOR IP): Performed by: EMERGENCY MEDICINE

## 2019-12-09 PROCEDURE — 81001 URINALYSIS AUTO W/SCOPE: CPT

## 2019-12-09 PROCEDURE — 70450 CT HEAD/BRAIN W/O DYE: CPT

## 2019-12-09 RX ORDER — METHYLPREDNISOLONE SODIUM SUCCINATE 125 MG/2ML
125 INJECTION, POWDER, LYOPHILIZED, FOR SOLUTION INTRAMUSCULAR; INTRAVENOUS ONCE
Status: COMPLETED | OUTPATIENT
Start: 2019-12-09 | End: 2019-12-09

## 2019-12-09 RX ORDER — DIAZEPAM 5 MG/ML
2.5 INJECTION, SOLUTION INTRAMUSCULAR; INTRAVENOUS ONCE
Status: COMPLETED | OUTPATIENT
Start: 2019-12-09 | End: 2019-12-09

## 2019-12-09 RX ORDER — MECLIZINE HCL 12.5 MG/1
25 TABLET ORAL ONCE
Status: COMPLETED | OUTPATIENT
Start: 2019-12-09 | End: 2019-12-09

## 2019-12-09 RX ORDER — PREDNISONE 50 MG/1
50 TABLET ORAL DAILY
Qty: 4 TABLET | Refills: 0 | Status: SHIPPED | OUTPATIENT
Start: 2019-12-09 | End: 2019-12-13

## 2019-12-09 RX ORDER — MECLIZINE HYDROCHLORIDE 25 MG/1
25 TABLET ORAL 3 TIMES DAILY PRN
Qty: 20 TABLET | Refills: 0 | Status: SHIPPED | OUTPATIENT
Start: 2019-12-09 | End: 2020-03-03 | Stop reason: ALTCHOICE

## 2019-12-09 RX ADMIN — METHYLPREDNISOLONE SODIUM SUCCINATE 125 MG: 125 INJECTION, POWDER, FOR SOLUTION INTRAMUSCULAR; INTRAVENOUS at 16:33

## 2019-12-09 RX ADMIN — DIAZEPAM 2.5 MG: 5 INJECTION, SOLUTION INTRAMUSCULAR; INTRAVENOUS at 16:34

## 2019-12-09 RX ADMIN — MECLIZINE 25 MG: 12.5 TABLET ORAL at 13:35

## 2019-12-09 ASSESSMENT — ENCOUNTER SYMPTOMS
DIARRHEA: 0
BACK PAIN: 0
SINUS PRESSURE: 0
CHEST TIGHTNESS: 0
SHORTNESS OF BREATH: 0
WHEEZING: 0
ABDOMINAL PAIN: 0
COUGH: 0
NAUSEA: 0
VOMITING: 0
SORE THROAT: 0
ABDOMINAL DISTENTION: 0

## 2019-12-11 LAB
EKG ATRIAL RATE: 78 BPM
EKG Q-T INTERVAL: 434 MS
EKG QRS DURATION: 164 MS
EKG QTC CALCULATION (BAZETT): 484 MS
EKG R AXIS: -98 DEGREES
EKG T AXIS: 126 DEGREES
EKG VENTRICULAR RATE: 75 BPM

## 2019-12-11 PROCEDURE — 93010 ELECTROCARDIOGRAM REPORT: CPT | Performed by: INTERNAL MEDICINE

## 2019-12-30 ENCOUNTER — OFFICE VISIT (OUTPATIENT)
Dept: FAMILY MEDICINE CLINIC | Age: 84
End: 2019-12-30
Payer: MEDICARE

## 2019-12-30 VITALS
DIASTOLIC BLOOD PRESSURE: 80 MMHG | SYSTOLIC BLOOD PRESSURE: 122 MMHG | RESPIRATION RATE: 16 BRPM | HEIGHT: 65 IN | BODY MASS INDEX: 25.33 KG/M2 | TEMPERATURE: 96.3 F | OXYGEN SATURATION: 96 % | WEIGHT: 152 LBS | HEART RATE: 96 BPM

## 2019-12-30 DIAGNOSIS — I10 ESSENTIAL HYPERTENSION: ICD-10-CM

## 2019-12-30 DIAGNOSIS — Z79.01 ANTICOAGULATION MANAGEMENT ENCOUNTER: Primary | ICD-10-CM

## 2019-12-30 DIAGNOSIS — Z51.81 ANTICOAGULATION MANAGEMENT ENCOUNTER: Primary | ICD-10-CM

## 2019-12-30 DIAGNOSIS — Z95.2 S/P MVR (MITRAL VALVE REPLACEMENT): ICD-10-CM

## 2019-12-30 LAB
INTERNATIONAL NORMALIZATION RATIO, POC: 1.7
PROTHROMBIN TIME, POC: NORMAL

## 2019-12-30 PROCEDURE — 85610 PROTHROMBIN TIME: CPT | Performed by: FAMILY MEDICINE

## 2019-12-30 PROCEDURE — 99213 OFFICE O/P EST LOW 20 MIN: CPT | Performed by: FAMILY MEDICINE

## 2020-02-04 ENCOUNTER — OFFICE VISIT (OUTPATIENT)
Dept: FAMILY MEDICINE CLINIC | Age: 85
End: 2020-02-04
Payer: MEDICARE

## 2020-02-04 VITALS
DIASTOLIC BLOOD PRESSURE: 72 MMHG | OXYGEN SATURATION: 100 % | HEART RATE: 72 BPM | WEIGHT: 152 LBS | TEMPERATURE: 98.1 F | HEIGHT: 64 IN | BODY MASS INDEX: 25.95 KG/M2 | SYSTOLIC BLOOD PRESSURE: 140 MMHG

## 2020-02-04 LAB
INTERNATIONAL NORMALIZATION RATIO, POC: 1.6
PROTHROMBIN TIME, POC: NORMAL

## 2020-02-04 PROCEDURE — 85610 PROTHROMBIN TIME: CPT | Performed by: FAMILY MEDICINE

## 2020-02-04 PROCEDURE — 99214 OFFICE O/P EST MOD 30 MIN: CPT | Performed by: FAMILY MEDICINE

## 2020-02-04 RX ORDER — VENLAFAXINE 37.5 MG/1
37.5 TABLET ORAL
COMMUNITY
Start: 2020-01-25

## 2020-02-04 RX ORDER — KETOROLAC TROMETHAMINE 4 MG/ML
SOLUTION/ DROPS OPHTHALMIC
COMMUNITY
Start: 2020-01-15

## 2020-02-04 RX ORDER — WARFARIN SODIUM 4 MG/1
4 TABLET ORAL DAILY
Qty: 90 TABLET | Refills: 0 | Status: SHIPPED
Start: 2020-02-04 | End: 2020-03-03 | Stop reason: DRUGHIGH

## 2020-02-04 NOTE — PROGRESS NOTES
incontinence  Musculoskeletal: joint pains off and on. Morning stiffness. Ambulating well  Neurologic: no paralysis, paresis, paresthesia, seizures, tremors, or headaches  Hematologic/Lymphatic/Immunologic: no anemia, abnormal bleeding/bruising, fever, chills, night sweats, swollen glands, or unexplained weight loss  Endocrine: no heat or cold intolerance and no polyphagia, polydipsia, or polyuria        OBJECTIVE:     VS:  Wt Readings from Last 3 Encounters:   02/04/20 152 lb (68.9 kg)   12/30/19 152 lb (68.9 kg)   12/09/19 150 lb (68 kg)     Temp Readings from Last 3 Encounters:   02/04/20 98.1 °F (36.7 °C)   12/30/19 96.3 °F (35.7 °C) (Temporal)   12/09/19 98.2 °F (36.8 °C) (Oral)     BP Readings from Last 3 Encounters:   02/04/20 (!) 140/72   12/30/19 122/80   12/09/19 (!) 160/85        General appearance: Alert, Awake, Oriented times 3, no distress  Skin: Warm and dry. 2 SMALL 1 CM. ROUND SCALY LESIONS ON THE LEFT FRONTAL AREA ALONG THE HAIR LINE. Head: Normocephalic. No masses, lesions or tenderness noted  Eyes: Conjunctivae appear normal. PERLE  Ears: External ears normal  Nose/Sinuses: Nares normal. Septum midline. Mucosa normal. No drainage  Oropharynx: Oropharynx clear with no exudate seen  Neck: Neck supple. No jugular venous distension, lymphadenopathy or thyromegaly Trachea midline  Chest:  Normal. Movements are Normal and Equal.  Lungs: Lungs clear to auscultation bilaterally. No ronchi, crackles or wheezes  Heart: S1 S2  Regular rate and rhythm. No rub, murmur or gallop  Abdomen: Abdomen soft, non-tender. BS normal. No masses, organomegaly. Back: Grossly Normal and Equal. DTR are Normal. SLR is Normal.  Extremities: Arthritic changes are noted. Movements are limited. Pedal pulses are normal.  Musculoskeletal: Muscular strength appears intact.  No joint effusion, tenderness, swelling or warmth  Neuro:  No focal motor or sensory deficits        ASSESSMENT     Patient Active Problem List    Diagnosis

## 2020-02-14 LAB
BASOPHILS ABSOLUTE: 0 /ΜL
BASOPHILS RELATIVE PERCENT: 0.5 %
CHOLESTEROL, TOTAL: 209 MG/DL
CHOLESTEROL/HDL RATIO: 3.7
EOSINOPHILS ABSOLUTE: 0.2 /ΜL
EOSINOPHILS RELATIVE PERCENT: 4 %
HCT VFR BLD CALC: 40.5 % (ref 36–46)
HDLC SERPL-MCNC: 57 MG/DL (ref 35–70)
HEMOGLOBIN: 13.7 G/DL (ref 12–16)
LDL CHOLESTEROL CALCULATED: 121 MG/DL (ref 0–160)
LYMPHOCYTES ABSOLUTE: 1.2 /ΜL
LYMPHOCYTES RELATIVE PERCENT: 21.8 %
MCH RBC QN AUTO: 32.2 PG
MCHC RBC AUTO-ENTMCNC: 33.9 G/DL
MCV RBC AUTO: 95 FL
MONOCYTES ABSOLUTE: 0.7 /ΜL
MONOCYTES RELATIVE PERCENT: 13.2 %
NEUTROPHILS ABSOLUTE: 3.2 /ΜL
NEUTROPHILS RELATIVE PERCENT: 60.5 %
PDW BLD-RTO: 14 %
PLATELET # BLD: 240 K/ΜL
PMV BLD AUTO: 8.6 FL
RBC # BLD: 4.26 10^6/ΜL
TRIGL SERPL-MCNC: 155 MG/DL
VLDLC SERPL CALC-MCNC: 31 MG/DL
WBC # BLD: 5.3 10^3/ML

## 2020-02-26 LAB
ALBUMIN SERPL-MCNC: 4 G/DL
ALP BLD-CCNC: 62 U/L
ALT SERPL-CCNC: 18 U/L
ANION GAP SERPL CALCULATED.3IONS-SCNC: 6 MMOL/L
AST SERPL-CCNC: 20 U/L
BILIRUB SERPL-MCNC: 0.4 MG/DL (ref 0.1–1.4)
BUN BLDV-MCNC: 17 MG/DL
CALCIUM SERPL-MCNC: 10.4 MG/DL
CHLORIDE BLD-SCNC: 104 MMOL/L
CO2: 27 MMOL/L
CREAT SERPL-MCNC: 0.7 MG/DL
GFR CALCULATED: >60
GLUCOSE BLD-MCNC: 95 MG/DL
POTASSIUM SERPL-SCNC: 4.3 MMOL/L
SODIUM BLD-SCNC: 137 MMOL/L
TOTAL PROTEIN: 6.9

## 2020-03-03 ENCOUNTER — OFFICE VISIT (OUTPATIENT)
Dept: FAMILY MEDICINE CLINIC | Age: 85
End: 2020-03-03
Payer: MEDICARE

## 2020-03-03 VITALS
RESPIRATION RATE: 18 BRPM | OXYGEN SATURATION: 94 % | TEMPERATURE: 97.3 F | WEIGHT: 152 LBS | SYSTOLIC BLOOD PRESSURE: 118 MMHG | HEIGHT: 65 IN | HEART RATE: 73 BPM | DIASTOLIC BLOOD PRESSURE: 60 MMHG | BODY MASS INDEX: 25.33 KG/M2

## 2020-03-03 LAB
INTERNATIONAL NORMALIZATION RATIO, POC: 1.4
PROTHROMBIN TIME, POC: NORMAL

## 2020-03-03 PROCEDURE — 99214 OFFICE O/P EST MOD 30 MIN: CPT | Performed by: FAMILY MEDICINE

## 2020-03-03 PROCEDURE — 85610 PROTHROMBIN TIME: CPT | Performed by: FAMILY MEDICINE

## 2020-03-03 RX ORDER — WARFARIN SODIUM 5 MG/1
5 TABLET ORAL DAILY
Qty: 90 TABLET | Refills: 0 | Status: SHIPPED
Start: 2020-03-03 | End: 2020-03-03 | Stop reason: SDUPTHER

## 2020-03-03 RX ORDER — WARFARIN SODIUM 5 MG/1
5 TABLET ORAL DAILY
Qty: 90 TABLET | Refills: 0 | Status: SHIPPED
Start: 2020-03-03 | End: 2020-04-23 | Stop reason: SDUPTHER

## 2020-03-03 RX ORDER — ROSUVASTATIN CALCIUM 20 MG/1
20 TABLET, COATED ORAL DAILY
Qty: 90 TABLET | Refills: 1 | Status: SHIPPED
Start: 2020-03-03 | End: 2020-05-07 | Stop reason: SDUPTHER

## 2020-03-03 NOTE — PATIENT INSTRUCTIONS
LOW SALT  AND LOW FAT DIET. CONTINUE CURRENT MEDICATIONS TAKING REGULARLY. STOP TAKING ZOCOR. TAKE CRESTOR 20 MG. DAILY. TAKE COUMADIN 4 MG. 2 TABLETS TODAY AND THEN CONTINUE 5 MG. DAILY. REGULAR WALKING ADVISED. NEXT APPOINTMENT IN 1 MONTH.

## 2020-03-03 NOTE — PROGRESS NOTES
Priority: High     Class: Chronic    Mixed hypercholesterolemia and hypertriglyceridemia 08/30/2012     Priority: High     Class: Chronic    Anticoagulation management encounter 12/22/2016        Diagnosis:     ICD-10-CM    1. Mixed hypercholesterolemia and hypertriglyceridemia E78.2     UNCONTROLLED   2. S/P MVR (mitral valve replacement) Z95.2 POCT INR    STABLE   3. Anticoagulation management encounter Z51.81     Z79.01     LOW   4. Essential hypertension I10     CONTROLLED   5. Major depressive disorder, recurrent episode, moderate (HCC) F33.1     CONTROLLED       PLAN:     INR OF 1.4 TODAY REVIEWED AND ADVISED. LABORATORY RESULTS 2/14/2020 REVIEWED AND DISCUSSED. Patient Instructions   LOW SALT  AND LOW FAT DIET. CONTINUE CURRENT MEDICATIONS TAKING REGULARLY. STOP TAKING ZOCOR. TAKE CRESTOR 20 MG. DAILY. TAKE COUMADIN 4 MG. 2 TABLETS TODAY AND THEN CONTINUE 5 MG. DAILY. REGULAR WALKING ADVISED. NEXT APPOINTMENT IN 1 MONTH. Return in about 1 month (around 4/3/2020) for FOLLOW UP VISIT. I have reviewed my findings and recommendations with ST SHELBIE PEÑALOZA.     Electronically signed by Indigo Armstrong MD on 3/3/20 at 2:42 PM

## 2020-04-23 ENCOUNTER — TELEPHONE (OUTPATIENT)
Dept: ADMINISTRATIVE | Age: 85
End: 2020-04-23

## 2020-04-23 RX ORDER — WARFARIN SODIUM 4 MG/1
4 TABLET ORAL DAILY
Qty: 90 TABLET | Refills: 0 | Status: SHIPPED | OUTPATIENT
Start: 2020-04-23 | End: 2020-04-24

## 2020-04-24 RX ORDER — WARFARIN SODIUM 4 MG/1
4 TABLET ORAL DAILY
Qty: 90 TABLET | Refills: 0 | Status: SHIPPED
Start: 2020-04-24 | End: 2020-06-11 | Stop reason: SDUPTHER

## 2020-05-07 ENCOUNTER — OFFICE VISIT (OUTPATIENT)
Dept: FAMILY MEDICINE CLINIC | Age: 85
End: 2020-05-07
Payer: MEDICARE

## 2020-05-07 VITALS
HEART RATE: 78 BPM | HEIGHT: 65 IN | WEIGHT: 154 LBS | OXYGEN SATURATION: 98 % | TEMPERATURE: 97.8 F | BODY MASS INDEX: 25.66 KG/M2 | RESPIRATION RATE: 18 BRPM | SYSTOLIC BLOOD PRESSURE: 128 MMHG | DIASTOLIC BLOOD PRESSURE: 68 MMHG

## 2020-05-07 LAB
INTERNATIONAL NORMALIZATION RATIO, POC: 2.1
PROTHROMBIN TIME, POC: NORMAL

## 2020-05-07 PROCEDURE — 99213 OFFICE O/P EST LOW 20 MIN: CPT | Performed by: FAMILY MEDICINE

## 2020-05-07 PROCEDURE — 85610 PROTHROMBIN TIME: CPT | Performed by: FAMILY MEDICINE

## 2020-05-07 RX ORDER — ROSUVASTATIN CALCIUM 20 MG/1
20 TABLET, COATED ORAL DAILY
Qty: 90 TABLET | Refills: 1 | Status: SHIPPED
Start: 2020-05-07 | End: 2020-06-11 | Stop reason: SDUPTHER

## 2020-05-07 NOTE — PATIENT INSTRUCTIONS
LOW SALT,LOW CARB. AND LOW FAT DIET. CONTINUE CURRENT MEDICATIONS TAKING REGULARLY. REGULAR WALKING ADVISED. FASTING FOR LAB WORK ONE MORNING. NEXT APPOINTMENT IN 1 MONTH.

## 2020-06-05 LAB
ALBUMIN SERPL-MCNC: 4.1 G/DL
ALP BLD-CCNC: 58 U/L
ALT SERPL-CCNC: 21 U/L
ANION GAP SERPL CALCULATED.3IONS-SCNC: 7 MMOL/L
AST SERPL-CCNC: 25 U/L
BILIRUB SERPL-MCNC: 0.3 MG/DL (ref 0.1–1.4)
BUN BLDV-MCNC: 18 MG/DL
CALCIUM SERPL-MCNC: 10.2 MG/DL
CHLORIDE BLD-SCNC: 105 MMOL/L
CHOLESTEROL, TOTAL: 165 MG/DL
CHOLESTEROL/HDL RATIO: 2.7
CO2: 28 MMOL/L
CREAT SERPL-MCNC: 0.64 MG/DL
GFR CALCULATED: NORMAL
GLUCOSE BLD-MCNC: 94 MG/DL
HDLC SERPL-MCNC: 62 MG/DL (ref 35–70)
LDL CHOLESTEROL CALCULATED: 71 MG/DL (ref 0–160)
POTASSIUM SERPL-SCNC: 4.2 MMOL/L
SODIUM BLD-SCNC: 140 MMOL/L
TOTAL PROTEIN: 7.1
TRIGL SERPL-MCNC: 162 MG/DL
VLDLC SERPL CALC-MCNC: 32 MG/DL

## 2020-06-11 ENCOUNTER — OFFICE VISIT (OUTPATIENT)
Dept: FAMILY MEDICINE CLINIC | Age: 85
End: 2020-06-11
Payer: MEDICARE

## 2020-06-11 VITALS
HEART RATE: 64 BPM | TEMPERATURE: 97.8 F | DIASTOLIC BLOOD PRESSURE: 79 MMHG | WEIGHT: 158 LBS | SYSTOLIC BLOOD PRESSURE: 120 MMHG | OXYGEN SATURATION: 99 % | HEIGHT: 65 IN | BODY MASS INDEX: 26.33 KG/M2 | RESPIRATION RATE: 18 BRPM

## 2020-06-11 LAB
INTERNATIONAL NORMALIZATION RATIO, POC: 3.2
PROTHROMBIN TIME, POC: NORMAL

## 2020-06-11 PROCEDURE — 85610 PROTHROMBIN TIME: CPT | Performed by: FAMILY MEDICINE

## 2020-06-11 PROCEDURE — 99214 OFFICE O/P EST MOD 30 MIN: CPT | Performed by: FAMILY MEDICINE

## 2020-06-11 RX ORDER — WARFARIN SODIUM 4 MG/1
4 TABLET ORAL DAILY
Qty: 90 TABLET | Refills: 0 | Status: SHIPPED
Start: 2020-06-11 | End: 2021-08-05 | Stop reason: ALTCHOICE

## 2020-06-11 RX ORDER — PANTOPRAZOLE SODIUM 40 MG/1
40 TABLET, DELAYED RELEASE ORAL DAILY
Qty: 30 TABLET | Refills: 0 | Status: SHIPPED | OUTPATIENT
Start: 2020-06-11

## 2020-06-11 RX ORDER — ROSUVASTATIN CALCIUM 20 MG/1
20 TABLET, COATED ORAL DAILY
Qty: 90 TABLET | Refills: 1 | Status: SHIPPED | OUTPATIENT
Start: 2020-06-11

## 2020-06-11 ASSESSMENT — PATIENT HEALTH QUESTIONNAIRE - PHQ9
SUM OF ALL RESPONSES TO PHQ QUESTIONS 1-9: 0
2. FEELING DOWN, DEPRESSED OR HOPELESS: 0
SUM OF ALL RESPONSES TO PHQ QUESTIONS 1-9: 0
1. LITTLE INTEREST OR PLEASURE IN DOING THINGS: 0
SUM OF ALL RESPONSES TO PHQ9 QUESTIONS 1 & 2: 0

## 2020-06-11 NOTE — PROGRESS NOTES
Comment: RARELY    Drug use: No    Sexual activity: Not Currently     Partners: Male   Lifestyle    Physical activity     Days per week: None     Minutes per session: None    Stress: None   Relationships    Social connections     Talks on phone: None     Gets together: None     Attends Muslim service: None     Active member of club or organization: None     Attends meetings of clubs or organizations: None     Relationship status: None    Intimate partner violence     Fear of current or ex partner: None     Emotionally abused: None     Physically abused: None     Forced sexual activity: None   Other Topics Concern    None   Social History Narrative    None       I have reviewed Zacks allergies, medications, problem list, medical, social and family history and have updated as needed in the electronic medical record  Review Of Systems:    Skin: no abnormal pigmentation, rash, scaling, itching, masses, hair or nail changes  Eyes: no blurring, diplopia, or eye pain  Ears/Nose/Throat: no hearing loss, tinnitus, vertigo, nosebleed, nasal congestion, rhinorrhea, sore throat  Respiratory: no cough, pleuritic chest pain, dyspnea, or wheezing  Cardiovascular: no chest pain, angina, dyspnea on exertion, orthopnea, PND, palpitations, or claudication  Gastrointestinal: no nausea, vomiting, heartburn, diarrhea, constipation, bloating,  abdominal pain, or blood per rectum. Appetite is good  Genitourinary: no urinary urgency, frequency, dysuria, nocturia, hesitancy, or incontinence  Musculoskeletal: joint pains off and on. Morning stiffness.  Ambulating well  Neurologic: no paralysis, paresis, paresthesia, seizures, tremors, or headaches  Hematologic/Lymphatic/Immunologic: no anemia, abnormal bleeding/bruising, fever, chills, night sweats, swollen glands, or unexplained weight loss  Endocrine: no heat or cold intolerance and no polyphagia, polydipsia, or polyuria        OBJECTIVE:     VS:  Wt Readings from Last 3

## 2020-06-11 NOTE — PATIENT INSTRUCTIONS
LOW SALT  AND LOW FAT DIET. CONTINUE CURRENT MEDICATIONS TAKING REGULARLY. HOLD COUMADIN FOR 1 DAY AND THEN CONTINUE SAME DOSE. INSTILL 2 DROPS OF BABY OIL IN BOTH EARS NIGHTLY FOR 4 NIGHTS. REGULAR WALKING ADVISED. NEXT APPOINTMENT IN 1 MONTH.

## 2020-07-09 ENCOUNTER — OFFICE VISIT (OUTPATIENT)
Dept: FAMILY MEDICINE CLINIC | Age: 85
End: 2020-07-09
Payer: MEDICARE

## 2020-07-09 VITALS
RESPIRATION RATE: 18 BRPM | DIASTOLIC BLOOD PRESSURE: 78 MMHG | SYSTOLIC BLOOD PRESSURE: 138 MMHG | OXYGEN SATURATION: 96 % | TEMPERATURE: 96.8 F | HEART RATE: 72 BPM | WEIGHT: 155 LBS | HEIGHT: 65 IN | BODY MASS INDEX: 25.83 KG/M2

## 2020-07-09 LAB
BILIRUBIN, POC: NORMAL
BLOOD URINE, POC: NORMAL
CLARITY, POC: CLEAR
COLOR, POC: YELLOW
GLUCOSE URINE, POC: NEGATIVE
INTERNATIONAL NORMALIZATION RATIO, POC: 5.8
KETONES, POC: NEGATIVE
LEUKOCYTE EST, POC: NEGATIVE
NITRITE, POC: NEGATIVE
PH, POC: 5
PROTEIN, POC: 30
PROTHROMBIN TIME, POC: NORMAL
SPECIFIC GRAVITY, POC: 1.03
UROBILINOGEN, POC: 0.2

## 2020-07-09 PROCEDURE — 81002 URINALYSIS NONAUTO W/O SCOPE: CPT | Performed by: FAMILY MEDICINE

## 2020-07-09 PROCEDURE — 85610 PROTHROMBIN TIME: CPT | Performed by: FAMILY MEDICINE

## 2020-07-09 PROCEDURE — 99214 OFFICE O/P EST MOD 30 MIN: CPT | Performed by: FAMILY MEDICINE

## 2020-07-09 NOTE — PROGRESS NOTES
OFFICE PROGRESS NOTE      SUBJECTIVE:        Patient ID:   Yolanda Swenson is a 82 y.o. female who presents for   Chief Complaint   Patient presents with    Office Visit for Anticoagulation Management    Urinary Frequency     x1 week           HPI:     FEELS GOOD. HAVING URINARY FREQUENCY FOR 1 WEEK. NO PAIN OR BURNING ON MICTURITION. NO FEVER.  WATCHING DIET GOOD. WALKING SOME FOR EXERCISE. TAKING MEDICATIONS REGULARLY. HAS BEEN TAKING SEROQUEL FOR LONG TIME. FEELS ITS NOT WORKING NOW. WILL SEE PSYCHIATRIST FOR RECOMMENDATION. Prior to Admission medications    Medication Sig Start Date End Date Taking? Authorizing Provider   warfarin (COUMADIN) 4 MG tablet Take 1 tablet by mouth daily 6/11/20 9/9/20 Yes Nile Samuels MD   rosuvastatin (CRESTOR) 20 MG tablet Take 1 tablet by mouth daily 6/11/20  Yes Nile Samuels MD   pantoprazole (PROTONIX) 40 MG tablet Take 1 tablet by mouth daily 6/11/20  Yes iNle Samuels MD   ketorolac 0.4 % SOLN ophthalmic solution  1/15/20  Yes Historical Provider, MD   venlafaxine [facility]) 37.5 MG tablet  1/25/20  Yes Historical Provider, MD   famotidine (PEPCID) 20 MG tablet  11/10/19  Yes Historical Provider, MD   LORazepam (ATIVAN) 0.5 MG tablet Take 0.5 mg by mouth 2 times daily as needed for Anxiety.  . 6/15/16  Yes Historical Provider, MD   QUEtiapine (SEROQUEL) 300 MG tablet Take 300 mg by mouth nightly  2/28/16  Yes Historical Provider, MD     Social History     Socioeconomic History    Marital status:      Spouse name: None    Number of children: None    Years of education: None    Highest education level: None   Occupational History    None   Social Needs    Financial resource strain: None    Food insecurity     Worry: None     Inability: None    Transportation needs     Medical: None     Non-medical: None   Tobacco Use    Smoking status: Never Smoker    Smokeless tobacco: Never Used   Substance and Sexual Activity    Alcohol use: No     Comment: RARELY    Drug use: No    Sexual activity: Not Currently     Partners: Male   Lifestyle    Physical activity     Days per week: None     Minutes per session: None    Stress: None   Relationships    Social connections     Talks on phone: None     Gets together: None     Attends Jainism service: None     Active member of club or organization: None     Attends meetings of clubs or organizations: None     Relationship status: None    Intimate partner violence     Fear of current or ex partner: None     Emotionally abused: None     Physically abused: None     Forced sexual activity: None   Other Topics Concern    None   Social History Narrative    None       I have reviewed Zacks allergies, medications, problem list, medical, social and family history and have updated as needed in the electronic medical record  Review Of Systems:    Skin: no abnormal pigmentation, rash, scaling, itching, masses, hair or nail changes  Eyes: no blurring, diplopia, or eye pain  Ears/Nose/Throat: no hearing loss, tinnitus, vertigo, nosebleed, nasal congestion, rhinorrhea, sore throat  Respiratory: no cough, pleuritic chest pain, dyspnea, or wheezing  Cardiovascular: no chest pain, angina, dyspnea on exertion, orthopnea, PND, palpitations, or claudication  Gastrointestinal: no nausea, vomiting, heartburn, diarrhea, constipation, bloating,  abdominal pain, or blood per rectum. Appetite is good  Genitourinary: no urinary urgency, frequency, dysuria, nocturia, hesitancy, or incontinence  Musculoskeletal: joint pains off and on. Morning stiffness.  Ambulating well  Neurologic: no paralysis, paresis, paresthesia, seizures, tremors, or headaches  Hematologic/Lymphatic/Immunologic: no anemia, abnormal bleeding/bruising, fever, chills, night sweats, swollen glands, or unexplained weight loss  Endocrine: no heat or cold intolerance and no polyphagia, polydipsia, or polyuria        OBJECTIVE: VS:  Wt Readings from Last 3 Encounters:   07/09/20 155 lb (70.3 kg)   06/11/20 158 lb (71.7 kg)   05/07/20 154 lb (69.9 kg)     Temp Readings from Last 3 Encounters:   07/09/20 96.8 °F (36 °C)   06/11/20 97.8 °F (36.6 °C)   05/07/20 97.8 °F (36.6 °C)     BP Readings from Last 3 Encounters:   07/09/20 138/78   06/11/20 120/79   05/07/20 128/68        General appearance: Alert, Awake, Oriented times 3, no distress  Skin: Warm and dry  Head: Normocephalic. No masses, lesions or tenderness noted  Eyes: Conjunctivae appear normal. PERLE  Ears: External ears normal  Nose/Sinuses: Nares normal. Septum midline. Mucosa normal. No drainage  Oropharynx: Oropharynx clear with no exudate seen  Neck: Neck supple. No jugular venous distension, lymphadenopathy or thyromegaly Trachea midline  Chest:  Normal. Movements are Normal and Equal.  Lungs: Lungs clear to auscultation bilaterally. No ronchi, crackles or wheezes  Heart: S1 S2  Regular rate and rhythm. No rub, murmur or gallop  Abdomen: Abdomen soft, non-tender. BS normal. No masses, organomegaly. Back: Grossly Normal and Equal. DTR are Normal. SLR is Normal.  Extremities: Arthritic changes are noted. Movements are limited. Pedal pulses are normal.  Musculoskeletal: Muscular strength appears intact. No joint effusion, tenderness, swelling or warmth  Neuro:  No focal motor or sensory deficits        ASSESSMENT     Patient Active Problem List    Diagnosis Date Noted    S/P MVR (mitral valve replacement) 07/11/2013     Priority: High    Essential hypertension 10/25/2012     Priority: High     Class: Chronic    Major depressive disorder, recurrent episode, moderate (HCC) 10/25/2012     Priority: High     Class: Chronic    Mixed hypercholesterolemia and hypertriglyceridemia 08/30/2012     Priority: High     Class: Chronic    Anticoagulation management encounter 12/22/2016        Diagnosis:     ICD-10-CM    1.  Anticoagulation management encounter Z51.81 POCT INR    Z79.01

## 2020-07-09 NOTE — PATIENT INSTRUCTIONS
LOW SALT AND LOW FAT DIET. CONTINUE CURRENT MEDICATIONS TAKING REGULARLY. HOLD COUMADIN FOR 3 DAYS AND THEN RESUME SAME DOSE. INR IN 1  WEEK. REGULAR WALKING ADVISED. ADVISED TO SEE PSYCHIATRIST FOR MEDICATION ADJUSTMENT. NEXT APPOINTMENT IN 1 MONTH.

## 2020-07-10 ENCOUNTER — TELEPHONE (OUTPATIENT)
Dept: FAMILY MEDICINE CLINIC | Age: 85
End: 2020-07-10

## 2020-07-13 ENCOUNTER — NURSE ONLY (OUTPATIENT)
Dept: FAMILY MEDICINE CLINIC | Age: 85
End: 2020-07-13
Payer: MEDICARE

## 2020-07-13 LAB
INTERNATIONAL NORMALIZATION RATIO, POC: 1.4
PROTHROMBIN TIME, POC: 16.9

## 2020-07-13 PROCEDURE — 85610 PROTHROMBIN TIME: CPT | Performed by: FAMILY MEDICINE

## 2020-07-21 ENCOUNTER — NURSE ONLY (OUTPATIENT)
Dept: FAMILY MEDICINE CLINIC | Age: 85
End: 2020-07-21
Payer: MEDICARE

## 2020-07-21 LAB
INTERNATIONAL NORMALIZATION RATIO, POC: 3.8
PROTHROMBIN TIME, POC: ABNORMAL

## 2020-07-21 PROCEDURE — 93793 ANTICOAG MGMT PT WARFARIN: CPT | Performed by: FAMILY MEDICINE

## 2020-07-21 PROCEDURE — 85610 PROTHROMBIN TIME: CPT | Performed by: FAMILY MEDICINE

## 2020-07-21 NOTE — PROGRESS NOTES
Patient INR 3.8 today after speaking with Dr Melquiades Desai he stated hold today then CCD 4 mg daily.  Patient already took her dose today so I had patient to hold tomorrow and recheck at next appointment with Dr Darinel Youssef on 8/10/2020

## 2020-08-10 ENCOUNTER — OFFICE VISIT (OUTPATIENT)
Dept: FAMILY MEDICINE CLINIC | Age: 85
End: 2020-08-10
Payer: MEDICARE

## 2020-08-10 VITALS
OXYGEN SATURATION: 97 % | WEIGHT: 152 LBS | TEMPERATURE: 98.1 F | HEART RATE: 74 BPM | SYSTOLIC BLOOD PRESSURE: 132 MMHG | HEIGHT: 65 IN | DIASTOLIC BLOOD PRESSURE: 60 MMHG | RESPIRATION RATE: 18 BRPM | BODY MASS INDEX: 25.33 KG/M2

## 2020-08-10 LAB
INTERNATIONAL NORMALIZATION RATIO, POC: 8
PROTHROMBIN TIME, POC: 96

## 2020-08-10 PROCEDURE — 85610 PROTHROMBIN TIME: CPT | Performed by: FAMILY MEDICINE

## 2020-08-10 PROCEDURE — 99213 OFFICE O/P EST LOW 20 MIN: CPT | Performed by: FAMILY MEDICINE

## 2020-08-10 NOTE — PROGRESS NOTES
OFFICE PROGRESS NOTE      SUBJECTIVE:        Patient ID:   Arnold Noonan is a 82 y.o. female who presents for   Chief Complaint   Patient presents with   Benjamin Pablo Office Visit for Anticoagulation Management           HPI:     FEELS GOOD. WATCHING DIET GOOD. WALKING FOR EXERCISE. TAKING MEDICATIONS REGULARLY. NO CHANGE IN DIET. Prior to Admission medications    Medication Sig Start Date End Date Taking? Authorizing Provider   warfarin (COUMADIN) 4 MG tablet Take 1 tablet by mouth daily 6/11/20 9/9/20 Yes Derick Jimenez MD   rosuvastatin (CRESTOR) 20 MG tablet Take 1 tablet by mouth daily 6/11/20  Yes Derick Jimenez MD   pantoprazole (PROTONIX) 40 MG tablet Take 1 tablet by mouth daily 6/11/20  Yes Derick Jimenez MD   ketorolac 0.4 % SOLN ophthalmic solution  1/15/20  Yes Historical Provider, MD   venlafaxine (EFFEXOR) 37.5 MG tablet 37.5 mg Every other day 1/25/20  Yes Historical Provider, MD   famotidine (PEPCID) 20 MG tablet  11/10/19  Yes Historical Provider, MD   LORazepam (ATIVAN) 0.5 MG tablet Take 0.5 mg by mouth 2 times daily as needed for Anxiety.  . 6/15/16  Yes Historical Provider, MD   QUEtiapine (SEROQUEL) 300 MG tablet Take 300 mg by mouth nightly  2/28/16  Yes Historical Provider, MD     Social History     Socioeconomic History    Marital status:      Spouse name: None    Number of children: None    Years of education: None    Highest education level: None   Occupational History    None   Social Needs    Financial resource strain: None    Food insecurity     Worry: None     Inability: None    Transportation needs     Medical: None     Non-medical: None   Tobacco Use    Smoking status: Never Smoker    Smokeless tobacco: Never Used   Substance and Sexual Activity    Alcohol use: No     Comment: RARELY    Drug use: No    Sexual activity: Not Currently     Partners: Male   Lifestyle    Physical activity     Days per week: None     Minutes per session: None    Stress: None   Relationships    Social connections     Talks on phone: None     Gets together: None     Attends Mormonism service: None     Active member of club or organization: None     Attends meetings of clubs or organizations: None     Relationship status: None    Intimate partner violence     Fear of current or ex partner: None     Emotionally abused: None     Physically abused: None     Forced sexual activity: None   Other Topics Concern    None   Social History Narrative    None       I have reviewed Kimberlyn's allergies, medications, problem list, medical, social and family history and have updated as needed in the electronic medical record  Review Of Systems:    Skin: no abnormal pigmentation, rash, scaling, itching, masses, hair or nail changes  Eyes: no blurring, diplopia, or eye pain  Ears/Nose/Throat: no hearing loss, tinnitus, vertigo, nosebleed, nasal congestion, rhinorrhea, sore throat  Respiratory: no cough, pleuritic chest pain, dyspnea, or wheezing  Cardiovascular: no chest pain, angina, dyspnea on exertion, orthopnea, PND, palpitations, or claudication  Gastrointestinal: no nausea, vomiting, heartburn, diarrhea, constipation, bloating,  abdominal pain, or blood per rectum. Appetite is good  Genitourinary: no urinary urgency, frequency, dysuria, nocturia, hesitancy, or incontinence  Musculoskeletal: joint pains off and on. Morning stiffness.  Ambulating well  Neurologic: no paralysis, paresis, paresthesia, seizures, tremors, or headaches  Hematologic/Lymphatic/Immunologic: no anemia, abnormal bleeding/bruising, fever, chills, night sweats, swollen glands, or unexplained weight loss  Endocrine: no heat or cold intolerance and no polyphagia, polydipsia, or polyuria        OBJECTIVE:     VS:  Wt Readings from Last 3 Encounters:   08/10/20 152 lb (68.9 kg)   07/09/20 155 lb (70.3 kg)   06/11/20 158 lb (71.7 kg)     Temp Readings from Last 3 Encounters:   08/10/20 98.1 °F (36.7 °C)   07/09/20 96.8 °F (36 °C)   06/11/20 97.8 °F (36.6 °C)     BP Readings from Last 3 Encounters:   08/10/20 132/60   07/09/20 138/78   06/11/20 120/79        General appearance: Alert, Awake, Oriented times 3, no distress  Skin: Warm and dry  Head: Normocephalic. No masses, lesions or tenderness noted  Eyes: Conjunctivae appear normal. PERLE  Ears: External ears normal  Nose/Sinuses: Nares normal. Septum midline. Mucosa normal. No drainage  Oropharynx: Oropharynx clear with no exudate seen  Neck: Neck supple. No jugular venous distension, lymphadenopathy or thyromegaly Trachea midline  Chest:  Normal. Movements are Normal and Equal.  Lungs: Lungs clear to auscultation bilaterally. No ronchi, crackles or wheezes  Heart: S1 S2  Regular rate and rhythm. No rub, murmur or gallop  Abdomen: Abdomen soft, non-tender. BS normal. No masses, organomegaly. Back: Grossly Normal and Equal. DTR are Normal. SLR is Normal.  Extremities: Arthritic changes are noted. Movements are limited. Pedal pulses are normal.  Musculoskeletal: Muscular strength appears intact. No joint effusion, tenderness, swelling or warmth  Neuro:  No focal motor or sensory deficits        ASSESSMENT     Patient Active Problem List    Diagnosis Date Noted    S/P MVR (mitral valve replacement) 07/11/2013     Priority: High    Essential hypertension 10/25/2012     Priority: High     Class: Chronic    Major depressive disorder, recurrent episode, moderate (HCC) 10/25/2012     Priority: High     Class: Chronic    Mixed hypercholesterolemia and hypertriglyceridemia 08/30/2012     Priority: High     Class: Chronic    Anticoagulation management encounter 12/22/2016        Diagnosis:     ICD-10-CM    1. Anticoagulation management encounter  Z51.81 POCT INR    Z79.01    2. S/P MVR (mitral valve replacement)  Z95.2    3. Essential hypertension  I10        PLAN:     INR 8.0 TODAY. REVIEWED AND DISCUSSED.       Patient Instructions   LOW SALT  AND LOW FAT

## 2020-08-10 NOTE — PATIENT INSTRUCTIONS
LOW SALT  AND LOW FAT DIET. CONTINUE CURRENT MEDICATIONS TAKING REGULARLY. HOLD COUMADIN NOW. INR IN 3.4 DAYS. REGULAR WALKING ADVISED. CALL OR GO TO ER FOR ANY BLEEDING EPISODE. NEXT APPOINTMENT IN 2 WEEKS.

## 2020-08-13 ENCOUNTER — NURSE ONLY (OUTPATIENT)
Dept: FAMILY MEDICINE CLINIC | Age: 85
End: 2020-08-13
Payer: MEDICARE

## 2020-08-13 LAB
INTERNATIONAL NORMALIZATION RATIO, POC: 2
PROTHROMBIN TIME, POC: NORMAL

## 2020-08-13 PROCEDURE — 85610 PROTHROMBIN TIME: CPT | Performed by: FAMILY MEDICINE

## 2020-08-17 ENCOUNTER — TELEPHONE (OUTPATIENT)
Dept: FAMILY MEDICINE CLINIC | Age: 85
End: 2020-08-17

## 2020-08-17 NOTE — TELEPHONE ENCOUNTER
Patient's  called to find out INR results. Advised 2.0 and that Dr. Gabriel Drummond noted continue same Coumadin dose. Patient's  stated understanding.

## 2020-09-08 ENCOUNTER — OFFICE VISIT (OUTPATIENT)
Dept: FAMILY MEDICINE CLINIC | Age: 85
End: 2020-09-08
Payer: MEDICARE

## 2020-09-08 VITALS
DIASTOLIC BLOOD PRESSURE: 60 MMHG | SYSTOLIC BLOOD PRESSURE: 122 MMHG | BODY MASS INDEX: 25.99 KG/M2 | TEMPERATURE: 98 F | WEIGHT: 156 LBS | RESPIRATION RATE: 18 BRPM | OXYGEN SATURATION: 96 % | HEART RATE: 80 BPM | HEIGHT: 65 IN

## 2020-09-08 PROCEDURE — 99214 OFFICE O/P EST MOD 30 MIN: CPT | Performed by: FAMILY MEDICINE

## 2020-09-08 RX ORDER — DOCUSATE SODIUM AND SENNOSIDES 8.6; 5 MG/1; MG/1
TABLET, COATED ORAL
COMMUNITY
Start: 2020-08-11

## 2020-09-08 RX ORDER — AZITHROMYCIN 250 MG/1
250 TABLET, FILM COATED ORAL DAILY
Qty: 6 TABLET | Refills: 0 | Status: SHIPPED
Start: 2020-09-08 | End: 2020-09-15 | Stop reason: SDUPTHER

## 2020-09-08 NOTE — PROGRESS NOTES
OFFICE PROGRESS NOTE      SUBJECTIVE:        Patient ID:   Hardeep Armstrong is a 82 y.o. female who presents for   Chief Complaint   Patient presents with    Office Visit for Anticoagulation Management    Cough     x 4 day's pt has took medication for the cough she has took mucinex its productive     Health Maintenance     due for AWV And dtap            HPI:     Acute Bronchitis: Patient presents for presents evaluation of productive cough with sputum described as yellow. Symptoms began 5 days ago and are unchanged since that time. Past history is significant for no history of pneumonia or bronchitis. DOES HAVE SOME PAIN ON THE RIGHT LOWER ASPECT OF CHEST. NO SHORTNESS OF BREATH. COUGH HELPED  SOME BY TAKING MUCINEX. HAS NOT BEEN OUT OF THE HOUSE OR RECEIVED ANY GUEST  FOR FEW  WEEKS  WATCHING DIET GOOD. NO  EXERCISE. TAKING MEDICATIONS REGULARLY. Prior to Admission medications    Medication Sig Start Date End Date Taking? Authorizing Provider   azithromycin (ZITHROMAX) 250 MG tablet Take 1 tablet by mouth daily 9/8/20  Yes Marciano Gustafson MD   warfarin (COUMADIN) 4 MG tablet Take 1 tablet by mouth daily 6/11/20 9/9/20 Yes Marciano Gustafson MD   rosuvastatin (CRESTOR) 20 MG tablet Take 1 tablet by mouth daily 6/11/20  Yes Marciano Gustafson MD   pantoprazole (PROTONIX) 40 MG tablet Take 1 tablet by mouth daily 6/11/20  Yes Marciano Gustafson MD   ketorolac 0.4 % SOLN ophthalmic solution  1/15/20  Yes Historical Provider, MD   venlafaxine (EFFEXOR) 37.5 MG tablet 37.5 mg Every other day 1/25/20  Yes Historical Provider, MD   famotidine (PEPCID) 20 MG tablet  11/10/19  Yes Historical Provider, MD   LORazepam (ATIVAN) 0.5 MG tablet Take 0.5 mg by mouth 2 times daily as needed for Anxiety.  . 6/15/16  Yes Historical Provider, MD   QUEtiapine (SEROQUEL) 300 MG tablet Take 300 mg by mouth nightly  2/28/16  Yes Historical Provider, MD [facility] 8.6-50 MG per tablet take 2 tablets by mouth every evening 8/11/20   Historical Provider, MD     Social History     Socioeconomic History    Marital status:      Spouse name: Not on file    Number of children: Not on file    Years of education: Not on file    Highest education level: Not on file   Occupational History    Not on file   Social Needs    Financial resource strain: Not on file    Food insecurity     Worry: Not on file     Inability: Not on file    Transportation needs     Medical: Not on file     Non-medical: Not on file   Tobacco Use    Smoking status: Never Smoker    Smokeless tobacco: Never Used   Substance and Sexual Activity    Alcohol use: No     Comment: RARELY    Drug use: No    Sexual activity: Not Currently     Partners: Male   Lifestyle    Physical activity     Days per week: Not on file     Minutes per session: Not on file    Stress: Not on file   Relationships    Social connections     Talks on phone: Not on file     Gets together: Not on file     Attends Temple service: Not on file     Active member of club or organization: Not on file     Attends meetings of clubs or organizations: Not on file     Relationship status: Not on file    Intimate partner violence     Fear of current or ex partner: Not on file     Emotionally abused: Not on file     Physically abused: Not on file     Forced sexual activity: Not on file   Other Topics Concern    Not on file   Social History Narrative    Not on file       I have reviewed Zacks allergies, medications, problem list, medical, social and family history and have updated as needed in the electronic medical record  Review Of Systems:    Skin: no abnormal pigmentation, rash, scaling, itching, masses, hair or nail changes  Eyes: no blurring, diplopia, or eye pain  Ears/Nose/Throat: no hearing loss, tinnitus, vertigo, nosebleed, nasal congestion, rhinorrhea, sore throat  Respiratory: no cough, pleuritic chest pain, dyspnea, or wheezing  Cardiovascular: no chest pain, angina, dyspnea on exertion, orthopnea, PND, palpitations, or claudication  Gastrointestinal: no nausea, vomiting, heartburn, diarrhea, constipation, bloating,  abdominal pain, or blood per rectum. Appetite is good  Genitourinary: no urinary urgency, frequency, dysuria, nocturia, hesitancy, or incontinence  Musculoskeletal: joint pains off and on. Morning stiffness. Ambulating well  Neurologic: no paralysis, paresis, paresthesia, seizures, tremors, or headaches  Hematologic/Lymphatic/Immunologic: no anemia, abnormal bleeding/bruising, fever, chills, night sweats, swollen glands, or unexplained weight loss  Endocrine: no heat or cold intolerance and no polyphagia, polydipsia, or polyuria        OBJECTIVE:     VS:  Wt Readings from Last 3 Encounters:   09/08/20 156 lb (70.8 kg)   08/10/20 152 lb (68.9 kg)   07/09/20 155 lb (70.3 kg)     Temp Readings from Last 3 Encounters:   09/08/20 98 °F (36.7 °C)   08/10/20 98.1 °F (36.7 °C)   07/09/20 96.8 °F (36 °C)     BP Readings from Last 3 Encounters:   09/08/20 122/60   08/10/20 132/60   07/09/20 138/78        General appearance: Alert, Awake, Oriented times 3, no distress  Skin: Warm and dry  Head: Normocephalic. No masses, lesions or tenderness noted  Eyes: Conjunctivae appear normal. PERLE  Ears: External ears normal  Nose/Sinuses: Nares normal. Septum midline. Mucosa normal. No drainage  Oropharynx: Oropharynx clear with no exudate seen  Neck: Neck supple. No jugular venous distension, lymphadenopathy or thyromegaly Trachea midline  Chest:  Normal. Movements are Normal and Equal.  Lungs: Lungs clear to auscultation bilaterally. No ronchi, crackles or wheezes  Heart: S1 S2  Regular rate and rhythm. No rub, murmur or gallop  Abdomen: Abdomen soft, non-tender. BS normal. No masses, organomegaly. Back: Grossly Normal and Equal. DTR are Normal. SLR is Normal.  Extremities: Arthritic changes are noted. Movements are limited.  Pedal pulses are normal.  Musculoskeletal: Muscular strength appears intact. No joint effusion, tenderness, swelling or warmth  Neuro:  No focal motor or sensory deficits        ASSESSMENT     Patient Active Problem List    Diagnosis Date Noted    S/P MVR (mitral valve replacement) 07/11/2013     Priority: High    Essential hypertension 10/25/2012     Priority: High     Class: Chronic    Major depressive disorder, recurrent episode, moderate (HCC) 10/25/2012     Priority: High     Class: Chronic    Mixed hypercholesterolemia and hypertriglyceridemia 08/30/2012     Priority: High     Class: Chronic    Anticoagulation management encounter 12/22/2016        Diagnosis:     ICD-10-CM    1. Acute bronchiolitis due to unspecified organism  J21.9 XR CHEST (2 VW)     azithromycin (ZITHROMAX) 250 MG tablet   2. Anticoagulation management encounter  Z51.81     Z79.01    3. Essential hypertension  I10    4. Mixed hypercholesterolemia and hypertriglyceridemia  E78.2        PLAN:           Patient Instructions   REST  FORCE FLUID ORALLY. TYLENOL AS NEEDED. TAKE Z-RICK AS DIRECTED. TAKE MUCINEX AS NEEDED. LOW SALT  AND LOW FAT DIET. CONTINUE CURRENT MEDICATIONS TAKING REGULARLY. CONTINUE COUMADIN SAME DOSE. XR CHEST AS RECOMMENDED. REGULAR WALKING ADVISED. NEXT APPOINTMENT IN 1 MONTH FOR ANNUAL PHYSICAL EXAMINATION. Return in about 1 month (around 10/8/2020) for [facility]. .         I have reviewed my findings and recommendations with [facility] SHELBI[facility] PEÑALOZA.     Electronically signed by Surinder Juarez MD on 9/8/20 at 2:45 PM EDT

## 2020-09-08 NOTE — PATIENT INSTRUCTIONS
REST  FORCE FLUID ORALLY. TYLENOL AS NEEDED. TAKE Z-RICK AS DIRECTED. TAKE MUCINEX AS NEEDED. LOW SALT  AND LOW FAT DIET. CONTINUE CURRENT MEDICATIONS TAKING REGULARLY. CONTINUE COUMADIN SAME DOSE. XR CHEST AS RECOMMENDED. REGULAR WALKING ADVISED. NEXT APPOINTMENT IN 1 MONTH FOR ANNUAL PHYSICAL EXAMINATION.

## 2020-09-10 ENCOUNTER — TELEPHONE (OUTPATIENT)
Dept: ADMINISTRATIVE | Age: 85
End: 2020-09-10

## 2020-09-11 ENCOUNTER — TELEPHONE (OUTPATIENT)
Dept: FAMILY MEDICINE CLINIC | Age: 85
End: 2020-09-11

## 2020-09-11 NOTE — TELEPHONE ENCOUNTER
called of get results of xray done.    Informed . Lupe Riggs that Dr. Fernando Marcos will discuss results on next visit

## 2020-09-14 ENCOUNTER — APPOINTMENT (OUTPATIENT)
Dept: GENERAL RADIOLOGY | Age: 85
End: 2020-09-14
Payer: MEDICARE

## 2020-09-14 ENCOUNTER — HOSPITAL ENCOUNTER (EMERGENCY)
Age: 85
Discharge: HOME | End: 2020-09-14
Attending: EMERGENCY MEDICINE
Payer: MEDICARE

## 2020-09-14 VITALS
BODY MASS INDEX: 25.96 KG/M2 | HEART RATE: 74 BPM | OXYGEN SATURATION: 95 % | SYSTOLIC BLOOD PRESSURE: 131 MMHG | WEIGHT: 156 LBS | TEMPERATURE: 97.3 F | DIASTOLIC BLOOD PRESSURE: 80 MMHG | RESPIRATION RATE: 18 BRPM

## 2020-09-14 LAB
ANION GAP SERPL CALCULATED.3IONS-SCNC: 12 MMOL/L (ref 7–16)
BACTERIA: ABNORMAL /HPF
BASOPHILS ABSOLUTE: 0.03 E9/L (ref 0–0.2)
BASOPHILS RELATIVE PERCENT: 0.6 % (ref 0–2)
BILIRUBIN URINE: NEGATIVE
BLOOD, URINE: NEGATIVE
BUN BLDV-MCNC: 14 MG/DL (ref 8–23)
CALCIUM SERPL-MCNC: 9.8 MG/DL (ref 8.6–10.2)
CHLORIDE BLD-SCNC: 104 MMOL/L (ref 98–107)
CLARITY: CLEAR
CO2: 25 MMOL/L (ref 22–29)
COLOR: YELLOW
CREAT SERPL-MCNC: 0.7 MG/DL (ref 0.5–1)
EOSINOPHILS ABSOLUTE: 0.2 E9/L (ref 0.05–0.5)
EOSINOPHILS RELATIVE PERCENT: 3.8 % (ref 0–6)
EPITHELIAL CELLS, UA: ABNORMAL /HPF
GFR AFRICAN AMERICAN: >60
GFR NON-AFRICAN AMERICAN: >60 ML/MIN/1.73
GLUCOSE BLD-MCNC: 166 MG/DL (ref 74–99)
GLUCOSE URINE: NEGATIVE MG/DL
HCT VFR BLD CALC: 39.7 % (ref 34–48)
HEMOGLOBIN: 13 G/DL (ref 11.5–15.5)
IMMATURE GRANULOCYTES #: 0.02 E9/L
IMMATURE GRANULOCYTES %: 0.4 % (ref 0–5)
INR BLD: 2.6
KETONES, URINE: NEGATIVE MG/DL
LEUKOCYTE ESTERASE, URINE: ABNORMAL
LYMPHOCYTES ABSOLUTE: 1.32 E9/L (ref 1.5–4)
LYMPHOCYTES RELATIVE PERCENT: 25 % (ref 20–42)
MCH RBC QN AUTO: 31.5 PG (ref 26–35)
MCHC RBC AUTO-ENTMCNC: 32.7 % (ref 32–34.5)
MCV RBC AUTO: 96.1 FL (ref 80–99.9)
MONOCYTES ABSOLUTE: 0.69 E9/L (ref 0.1–0.95)
MONOCYTES RELATIVE PERCENT: 13.1 % (ref 2–12)
NEUTROPHILS ABSOLUTE: 3.02 E9/L (ref 1.8–7.3)
NEUTROPHILS RELATIVE PERCENT: 57.1 % (ref 43–80)
NITRITE, URINE: NEGATIVE
PDW BLD-RTO: 13.3 FL (ref 11.5–15)
PH UA: 5.5 (ref 5–9)
PLATELET # BLD: 222 E9/L (ref 130–450)
PMV BLD AUTO: 10 FL (ref 7–12)
POTASSIUM REFLEX MAGNESIUM: 4.1 MMOL/L (ref 3.5–5)
PRO-BNP: 900 PG/ML (ref 0–450)
PROTEIN UA: NEGATIVE MG/DL
PROTHROMBIN TIME: 30.1 SEC (ref 9.3–12.4)
RBC # BLD: 4.13 E12/L (ref 3.5–5.5)
RBC UA: ABNORMAL /HPF (ref 0–2)
SODIUM BLD-SCNC: 141 MMOL/L (ref 132–146)
SPECIFIC GRAVITY UA: >=1.03 (ref 1–1.03)
TROPONIN: <0.01 NG/ML (ref 0–0.03)
UROBILINOGEN, URINE: 0.2 E.U./DL
WBC # BLD: 5.3 E9/L (ref 4.5–11.5)
WBC UA: ABNORMAL /HPF (ref 0–5)

## 2020-09-14 PROCEDURE — 85610 PROTHROMBIN TIME: CPT

## 2020-09-14 PROCEDURE — 85025 COMPLETE CBC W/AUTO DIFF WBC: CPT

## 2020-09-14 PROCEDURE — 99285 EMERGENCY DEPT VISIT HI MDM: CPT

## 2020-09-14 PROCEDURE — 71046 X-RAY EXAM CHEST 2 VIEWS: CPT

## 2020-09-14 PROCEDURE — 81001 URINALYSIS AUTO W/SCOPE: CPT

## 2020-09-14 PROCEDURE — 80048 BASIC METABOLIC PNL TOTAL CA: CPT

## 2020-09-14 PROCEDURE — 93005 ELECTROCARDIOGRAM TRACING: CPT | Performed by: STUDENT IN AN ORGANIZED HEALTH CARE EDUCATION/TRAINING PROGRAM

## 2020-09-14 PROCEDURE — 84484 ASSAY OF TROPONIN QUANT: CPT

## 2020-09-14 PROCEDURE — 99284 EMERGENCY DEPT VISIT MOD MDM: CPT

## 2020-09-14 PROCEDURE — 83880 ASSAY OF NATRIURETIC PEPTIDE: CPT

## 2020-09-14 ASSESSMENT — ENCOUNTER SYMPTOMS
SHORTNESS OF BREATH: 1
ABDOMINAL PAIN: 0
DIARRHEA: 0
RHINORRHEA: 0
NAUSEA: 0
COUGH: 0
BACK PAIN: 0
SORE THROAT: 0
VOMITING: 0

## 2020-09-14 NOTE — ED PROVIDER NOTES
Cheri Miner is a 82 y.o. female with a PMHx significant for HTN, HLD, valve replacement on anticoagulation who presents for evaluation of shortness of breath, beginning 3 days prior to arrival.  The complaint has been intermittent, moderate in severity, and worsened by nothing. The patient states that 3 days ago while at rest she started to feel as if she cannot take a deep breath in. She saw her PCP, Dr. Chivo Roman and was started on medication for bronchitis. Patient denies any fevers, chills, cough, congestion. She notes she continues to have the symptoms where she feels it is hard to take a deep breath in. She states this happens then she will count of 5 and will be okay. This is occurring multiple times a day so she is concerned is presenting for evaluation. She does not notice it worse on exertion or anything that makes it better. She denies any dizziness, lightheadedness, chest pain, nausea, vomiting. The history is provided by the patient and medical records. Review of Systems   Constitutional: Negative for chills, diaphoresis and fever. HENT: Negative for congestion, rhinorrhea and sore throat. Eyes: Negative for visual disturbance. Respiratory: Positive for shortness of breath. Negative for cough. Cardiovascular: Negative for chest pain, palpitations and leg swelling. Gastrointestinal: Negative for abdominal pain, diarrhea, nausea and vomiting. Genitourinary: Negative for dysuria and urgency. Musculoskeletal: Negative for arthralgias, back pain and myalgias. Skin: Negative for pallor and rash. Neurological: Negative for dizziness, light-headedness, numbness and headaches. Hematological: Negative for adenopathy. Does not bruise/bleed easily. Physical Exam  Vitals signs and nursing note reviewed. Constitutional:       General: She is not in acute distress. Appearance: She is well-developed and normal weight.  She is not ill-appearing, toxic-appearing or diaphoretic. HENT:      Head: Normocephalic and atraumatic. Eyes:      General:         Right eye: No discharge. Left eye: No discharge. Extraocular Movements: Extraocular movements intact. Conjunctiva/sclera: Conjunctivae normal.      Pupils: Pupils are equal, round, and reactive to light. Neck:      Musculoskeletal: Normal range of motion and neck supple. Cardiovascular:      Rate and Rhythm: Normal rate and regular rhythm. Heart sounds: Normal heart sounds. No murmur. Pulmonary:      Effort: Pulmonary effort is normal. No respiratory distress. Breath sounds: Normal breath sounds. No stridor. No wheezing or rhonchi. Abdominal:      General: Bowel sounds are normal. There is no distension. Palpations: Abdomen is soft. There is no mass. Tenderness: There is no abdominal tenderness. There is no guarding or rebound. Hernia: No hernia is present. Musculoskeletal: Normal range of motion. General: No swelling, tenderness or deformity. Comments: There is no pretibial edema nor calf tenderness bilaterally      Skin:     General: Skin is warm and dry. Coloration: Skin is not jaundiced or pale. Findings: No bruising. Neurological:      General: No focal deficit present. Mental Status: She is alert and oriented to person, place, and time. Cranial Nerves: No cranial nerve deficit. Coordination: Coordination normal.          Procedures     MDM     ED Course as of Sep 16 0113   Mon Sep 14, 2020   Aeljandro Mojica to reevaluate the patient. She sitting in bed in no acute distress. Discussed results and reasons to return. [BB]   1952 EKG: This EKG is signed and interpreted by me.     Rate: 70  Rhythm: Ventricular-paced  Interpretation: ventricular-paced, no ST-Elevations or depressions  Comparison: Non-specific changes compared to previous EKG 12/04/2019      [BB]      ED Course User Index  [BB] Aguilar Cantor, [address] presents to the ED for evaluation of shortness of breath. Patient feels its difficult to take a full deep breath in from time to time. She was seen by her PCP and placed on antibiotics without much improvement. Differential diagnoses included but not limited to Pneumonia, cardiac arrhythmia, pleural effusion, anemia. Workup in the ED revealed labs within normal limits. CXR demonstrating lung atelectasis. Patient was not hypoxic, febrile, or tachycardic during her stay. Patient continues to be non-toxic on re-evaluation. Findings were discussed with the patient and reasons to immediately return to the ED were articulated to them. They will follow-up with their PCP.    --------------------------------------------- PAST HISTORY ---------------------------------------------  Past Medical History:  has a past medical history of CHF (congestive heart failure) ([facility] [address] Difficult intubation, and Kidney stones. Past Surgical History:  has a past surgical history that includes Cardiac surgery (01/08/2007); Cardiac valve replacement (01/07/07); pacemaker placement; Kidney stone surgery; Cataract removal with implant (Right, 12/10/13); Pacemaker insertion (Left); Colonoscopy; and Cataract removal with implant (Left, 05 10 2016). Social History:  reports that she has never smoked. She has never used smokeless tobacco. She reports that she does not drink alcohol or use drugs. Family History: family history includes Alcohol Abuse in her father; Mental Illness in her maternal grandmother. The patients home medications have been reviewed. Allergies: Patient has no known allergies.     -------------------------------------------------- RESULTS -------------------------------------------------  Labs:  Results for orders placed or performed during the hospital encounter of 09/14/20   CBC Auto Differential   Result Value Ref Range    WBC 5.3 4.5 - 11.5 E9/L    RBC 4.13 3.50 - 5.50 E12/L    Hemoglobin 13.0 11.5 - 15.5 g/dL Hematocrit 39.7 34.0 - 48.0 %    MCV 96.1 80.0 - 99.9 fL    MCH 31.5 26.0 - 35.0 pg    MCHC 32.7 32.0 - 34.5 %    RDW 13.3 11.5 - 15.0 fL    Platelets 929 130 - 689 E9/L    MPV 10.0 7.0 - 12.0 fL    Neutrophils % 57.1 43.0 - 80.0 %    Immature Granulocytes % 0.4 0.0 - 5.0 %    Lymphocytes % 25.0 20.0 - 42.0 %    Monocytes % 13.1 (H) 2.0 - 12.0 %    Eosinophils % 3.8 0.0 - 6.0 %    Basophils % 0.6 0.0 - 2.0 %    Neutrophils Absolute 3.02 1.80 - 7.30 E9/L    Immature Granulocytes # 0.02 E9/L    Lymphocytes Absolute 1.32 (L) 1.50 - 4.00 E9/L    Monocytes Absolute 0.69 0.10 - 0.95 E9/L    Eosinophils Absolute 0.20 0.05 - 0.50 E9/L    Basophils Absolute 0.03 0.00 - 0.20 I5/Z   Basic Metabolic Panel w/ Reflex to MG   Result Value Ref Range    Sodium 141 132 - 146 mmol/L    Potassium reflex Magnesium 4.1 3.5 - 5.0 mmol/L    Chloride 104 98 - 107 mmol/L    CO2 25 22 - 29 mmol/L    Anion Gap 12 7 - 16 mmol/L    Glucose 166 (H) 74 - 99 mg/dL    BUN 14 8 - 23 mg/dL    CREATININE 0.7 0.5 - 1.0 mg/dL    GFR Non-African American >60 >=60 mL/min/1.73    GFR African American >60     Calcium 9.8 8.6 - 10.2 mg/dL   Troponin   Result Value Ref Range    Troponin <0.01 0.00 - 0.03 ng/mL   Brain Natriuretic Peptide   Result Value Ref Range    Pro- (H) 0 - 450 pg/mL   Urinalysis, reflex to microscopic   Result Value Ref Range    Color, UA Yellow Straw/Yellow    Clarity, UA Clear Clear    Glucose, Ur Negative Negative mg/dL    Bilirubin Urine Negative Negative    Ketones, Urine Negative Negative mg/dL    Specific Gravity, UA >=1.030 1.005 - 1.030    Blood, Urine Negative Negative    pH, UA 5.5 5.0 - 9.0    Protein, UA Negative Negative mg/dL    Urobilinogen, Urine 0.2 <2.0 E.U./dL    Nitrite, Urine Negative Negative    Leukocyte Esterase, Urine SMALL (A) Negative   Protime-INR   Result Value Ref Range    Protime 30.1 (H) 9.3 - 12.4 sec    INR 2.6    Microscopic Urinalysis   Result Value Ref Range    WBC, UA 5-10 (A) 0 - 5 /HPF RBC, UA NONE 0 - 2 /HPF    Epithelial Cells, UA FEW /HPF    Bacteria, UA FEW (A) None Seen /HPF   EKG 12 Lead   Result Value Ref Range    Ventricular Rate 70 BPM    Atrial Rate 258 BPM    QRS Duration 170 ms    Q-T Interval 438 ms    QTc Calculation (Bazett) 473 ms    R Axis -89 degrees    T Axis 120 degrees       Radiology:  XR CHEST (2 VW)   Final Result   Stable cardiomegaly. Left lower lung atelectasis.             ------------------------- NURSING NOTES AND VITALS REVIEWED ---------------------------  Date / Time Roomed:  9/14/2020  6:15 PM  ED Bed Assignment:  09/09    The nursing notes within the ED encounter and vital signs as below have been reviewed. /80   Pulse 74   Temp 97.3 °F (36.3 °C) (Infrared)   Resp 18   Wt 156 lb (70.8 kg)   SpO2 95%   BMI 25.96 kg/m²   Oxygen Saturation Interpretation: Normal      ------------------------------------------ PROGRESS NOTES ------------------------------------------  1:13 AM EDT  I have spoken with the patient and discussed todays results, in addition to providing specific details for the plan of care and counseling regarding the diagnosis and prognosis. Their questions are answered at this time and they are agreeable with the plan. I discussed at length with them reasons for immediate return here for re evaluation. They will followup with their primary care physician by calling their office tomorrow. --------------------------------- ADDITIONAL PROVIDER NOTES ---------------------------------  At this time the patient is without objective evidence of an acute process requiring hospitalization or inpatient management. They have remained hemodynamically stable throughout their entire ED visit and are stable for discharge with outpatient follow-up. The plan has been discussed in detail and they are aware of the specific conditions for emergent return, as well as the importance of follow-up.       Discharge Medication List as of 9/14/2020

## 2020-09-14 NOTE — ED NOTES
AC completed the 12 lead ECG. The pt was placed onto telemetry monitoring , a tracing was placed into the pt's lite chart.       Edgar Michelle RN  09/14/20 [id number]

## 2020-09-15 ENCOUNTER — TELEPHONE (OUTPATIENT)
Dept: FAMILY MEDICINE CLINIC | Age: 85
End: 2020-09-15

## 2020-09-15 ENCOUNTER — OFFICE VISIT (OUTPATIENT)
Dept: FAMILY MEDICINE CLINIC | Age: 85
End: 2020-09-15
Payer: MEDICARE

## 2020-09-15 ENCOUNTER — CARE COORDINATION (OUTPATIENT)
Dept: CASE MANAGEMENT | Age: 85
End: 2020-09-15

## 2020-09-15 VITALS
OXYGEN SATURATION: 98 % | DIASTOLIC BLOOD PRESSURE: 62 MMHG | RESPIRATION RATE: 18 BRPM | TEMPERATURE: 98 F | SYSTOLIC BLOOD PRESSURE: 122 MMHG | WEIGHT: 157 LBS | HEIGHT: 65 IN | BODY MASS INDEX: 26.16 KG/M2 | HEART RATE: 73 BPM

## 2020-09-15 PROBLEM — J44.9 CHRONIC OBSTRUCTIVE PULMONARY DISEASE (HCC): Status: ACTIVE | Noted: 2020-09-15

## 2020-09-15 LAB
EKG ATRIAL RATE: 258 BPM
EKG Q-T INTERVAL: 438 MS
EKG QRS DURATION: 170 MS
EKG QTC CALCULATION (BAZETT): 473 MS
EKG R AXIS: -89 DEGREES
EKG T AXIS: 120 DEGREES
EKG VENTRICULAR RATE: 70 BPM
EXPIRATORY TIME-POST: NORMAL
EXPIRATORY TIME: NORMAL
FEF 25-75% %CHNG: NORMAL
FEF 25-75% %PRED-POST: NORMAL
FEF 25-75% %PRED-PRE: NORMAL
FEF 25-75% PRED: NORMAL
FEF 25-75%-POST: NORMAL
FEF 25-75%-PRE: NORMAL
FEV1 %PRED-POST: NORMAL
FEV1 %PRED-PRE: 1.03 %
FEV1 PRED: NORMAL
FEV1-POST: NORMAL
FEV1-PRE: NORMAL
FEV1/FVC %PRED-POST: NORMAL
FEV1/FVC %PRED-PRE: NORMAL
FEV1/FVC PRED: NORMAL
FEV1/FVC-POST: NORMAL
FEV1/FVC-PRE: 1.44 %
FVC %PRED-POST: NORMAL
FVC %PRED-PRE: NORMAL
FVC PRED: NORMAL
FVC-POST: NORMAL
FVC-PRE: NORMAL
PEF %PRED-POST: NORMAL
PEF %PRED-PRE: NORMAL
PEF PRED: NORMAL
PEF%CHNG: NORMAL
PEF-POST: NORMAL
PEF-PRE: NORMAL

## 2020-09-15 PROCEDURE — 93010 ELECTROCARDIOGRAM REPORT: CPT | Performed by: INTERNAL MEDICINE

## 2020-09-15 PROCEDURE — 99214 OFFICE O/P EST MOD 30 MIN: CPT | Performed by: FAMILY MEDICINE

## 2020-09-15 RX ORDER — AZITHROMYCIN 250 MG/1
250 TABLET, FILM COATED ORAL DAILY
Qty: 6 TABLET | Refills: 0 | Status: CANCELLED | OUTPATIENT
Start: 2020-09-15

## 2020-09-15 RX ORDER — AZITHROMYCIN 250 MG/1
250 TABLET, FILM COATED ORAL DAILY
Qty: 6 TABLET | Refills: 0 | Status: SHIPPED
Start: 2020-09-15 | End: 2021-08-05

## 2020-09-15 ASSESSMENT — PULMONARY FUNCTION TESTS
FEV1/FVC_PRE: 1.44
FEV1_PERCENT_PREDICTED_PRE: 1.03

## 2020-09-15 NOTE — PROGRESS NOTES
OFFICE PROGRESS NOTE      SUBJECTIVE:        Patient ID:   Fatemeh Stevens is a 82 y.o. female who presents for   Chief Complaint   Patient presents with    Follow-up     Pt was seen in ED 9/14/2020 for SOB           HPI:     SHORTNESS OF BREATH WORSE FOR 1 WEEK. NO COUGHING OR CHEST PAIN. ALSO FEELS TIRED EASILY. EATING  GOOD. NO  EXERCISE. TAKING MEDICATIONS REGULARLY. Prior to Admission medications    Medication Sig Start Date End Date Taking? Authorizing Provider   azithromycin (ZITHROMAX) 250 MG tablet Take 1 tablet by mouth daily 9/15/20  Yes Shawn Meyer MD   umeclidinium-vilanterol [facility] ELLIPTA) 62.5-25 MCG/INH AEPB inhaler Inhale 1 puff into the lungs daily 9/15/20  Yes Shawn Meyer MD   RA LAXATIVE & STOOL SOFTENER 8.6-50 MG per tablet take 2 tablets by mouth every evening 8/11/20  Yes Historical Provider, MD   warfarin (COUMADIN) 4 MG tablet Take 1 tablet by mouth daily 6/11/20 9/15/20 Yes Shawn Meyer MD   rosuvastatin (CRESTOR) 20 MG tablet Take 1 tablet by mouth daily 6/11/20  Yes Shawn Meyer MD   pantoprazole (PROTONIX) 40 MG tablet Take 1 tablet by mouth daily 6/11/20  Yes Shawn Meyer MD   ketorolac 0.4 % SOLN ophthalmic solution  1/15/20  Yes Historical Provider, MD   venlafaxine (EFFEXOR) 37.5 MG tablet 37.5 mg Every other day 1/25/20  Yes Historical Provider, MD   famotidine (PEPCID) 20 MG tablet  11/10/19  Yes Historical Provider, MD   LORazepam (ATIVAN) 0.5 MG tablet Take 0.5 mg by mouth 2 times daily as needed for Anxiety.  . 6/15/16  Yes Historical Provider, MD   QUEtiapine (SEROQUEL) 300 MG tablet Take 300 mg by mouth nightly  2/28/16  Yes Historical Provider, MD     Social History     Socioeconomic History    Marital status:      Spouse name: None    Number of children: None    Years of education: None    Highest education level: None   Occupational History    None   Social Needs    Financial resource strain: None    Food insecurity     Worry: None     Inability: None    Transportation needs     Medical: None     Non-medical: None   Tobacco Use    Smoking status: Never Smoker    Smokeless tobacco: Never Used   Substance and Sexual Activity    Alcohol use: No     Comment: RARELY    Drug use: No    Sexual activity: Not Currently     Partners: Male   Lifestyle    Physical activity     Days per week: None     Minutes per session: None    Stress: None   Relationships    Social connections     Talks on phone: None     Gets together: None     Attends Restorationism service: None     Active member of club or organization: None     Attends meetings of clubs or organizations: None     Relationship status: None    Intimate partner violence     Fear of current or ex partner: None     Emotionally abused: None     Physically abused: None     Forced sexual activity: None   Other Topics Concern    None   Social History Narrative    None       I have reviewed Kimberlyn's allergies, medications, problem list, medical, social and family history and have updated as needed in the electronic medical record  Review Of Systems:    Skin: no abnormal pigmentation, rash, scaling, itching, masses, hair or nail changes  Eyes: no blurring, diplopia, or eye pain  Ears/Nose/Throat: no hearing loss, tinnitus, vertigo, nosebleed, nasal congestion, rhinorrhea, sore throat  Respiratory: no cough, pleuritic chest pain, dyspnea, or wheezing  Cardiovascular: no chest pain, angina, dyspnea on exertion, orthopnea, PND, palpitations, or claudication  Gastrointestinal: no nausea, vomiting, heartburn, diarrhea, constipation, bloating,  abdominal pain, or blood per rectum. Appetite is good  Genitourinary: no urinary urgency, frequency, dysuria, nocturia, hesitancy, or incontinence  Musculoskeletal: joint pains off and on. Morning stiffness.  Ambulating well  Neurologic: no paralysis, paresis, paresthesia, seizures, tremors, or headaches  Hematologic/Lymphatic/Immunologic: no anemia, abnormal bleeding/bruising, fever, chills, night sweats, swollen glands, or unexplained weight loss  Endocrine: no heat or cold intolerance and no polyphagia, polydipsia, or polyuria        OBJECTIVE:     VS:  Wt Readings from Last 3 Encounters:   09/15/20 157 lb (71.2 kg)   09/14/20 156 lb (70.8 kg)   09/08/20 156 lb (70.8 kg)     Temp Readings from Last 3 Encounters:   09/15/20 98 °F (36.7 °C)   09/14/20 97.3 °F (36.3 °C) (Infrared)   09/08/20 98 °F (36.7 °C)     BP Readings from Last 3 Encounters:   09/15/20 122/62   09/14/20 131/80   09/08/20 122/60        General appearance: Alert, Awake, Oriented times 3, no distress  Skin: Warm and dry  Head: Normocephalic. No masses, lesions or tenderness noted  Eyes: Conjunctivae appear normal. PERLE  Ears: External ears normal  Nose/Sinuses: Nares normal. Septum midline. Mucosa normal. No drainage  Oropharynx: Oropharynx clear with no exudate seen  Neck: Neck supple. No jugular venous distension, lymphadenopathy or thyromegaly Trachea midline  Chest:  Normal. Movements are Normal and Equal.  Lungs: Lungs clear to auscultation bilaterally. No ronchi, crackles or wheezes  Heart: S1 S2  Regular rate and rhythm. No rub, murmur or gallop  Abdomen: Abdomen soft, non-tender. BS normal. No masses, organomegaly. Back: Grossly Normal and Equal. DTR are Normal. SLR is Normal.  Extremities: Arthritic changes are noted. Movements are limited. Pedal pulses are normal.  Musculoskeletal: Muscular strength appears intact.  No joint effusion, tenderness, swelling or warmth  Neuro:  No focal motor or sensory deficits        ASSESSMENT     Patient Active Problem List    Diagnosis Date Noted    S/P MVR (mitral valve replacement) 07/11/2013     Priority: High    Essential hypertension 10/25/2012     Priority: High     Class: Chronic    Major depressive disorder, recurrent episode, moderate ([facility] 10/25/2012     Priority: High Class: Chronic    Mixed hypercholesterolemia and hypertriglyceridemia 08/30/2012     Priority: High     Class: Chronic    Chronic obstructive pulmonary disease ([facility] 09/15/2020    Anticoagulation management encounter 12/22/2016        Diagnosis:     ICD-10-CM    1. Chronic obstructive pulmonary disease, unspecified COPD type ([facility]  J44.9     NEW ONSET   2. SOB (shortness of breath)  R06.02 Spirometry With OR Without Bronchodilator   3. S/P MVR (mitral valve replacement)  Z95.2     STABLE   4. Essential hypertension  I10     CONTROLLED       PLAN:     SPIROMETRY: Moderately severe obstruction. XR CHEST:   Stable cardiomegaly.         Left lower lung atelectasis. Patient Instructions   LOW SALT LOW FAT DIET. CONTINUE CURRENT MEDICATIONS TAKING REGULARLY. ANORO 1 PUFF DAILY. REGULAR WALKING ADVISED. KEEP NEXT APPOINTMENT IN 1 MONTH FOE ANNUAL PHYSICAL EXAMINATION . Return in about 1 month (around 10/15/2020) for [employer] .         I have reviewed my findings and recommendations with [facility] PE[facility]AL[facility].     Electronically signed by Gabirel Real MD on 9/15/20 at 2:01 PM EDT

## 2020-09-15 NOTE — CARE COORDINATION
Covid-19 Initial Outreach call, no answer.   Left VM with contact information and request  for return call at [address], [address] [place] [address] Coordination Transition

## 2020-09-15 NOTE — TELEPHONE ENCOUNTER
Pt's  called in about last visit and his wife being diagnosed with bronchitis needs for meds orderd that was given last week. Cristian Kline was taken  to [address] [place] [address] 300 ED on Monday. Was advised to come in a see her PCP for F/U. Advised Mr. Zamzam Schneider that Dr. Sarah Mathis should see her before more meds given.

## 2020-09-15 NOTE — PATIENT INSTRUCTIONS
LOW SALT LOW FAT DIET. CONTINUE CURRENT MEDICATIONS TAKING REGULARLY. ANORO 1 PUFF DAILY. REGULAR WALKING ADVISED. KEEP NEXT APPOINTMENT IN 1 MONTH FOE ANNUAL PHYSICAL EXAMINATION .

## 2020-09-16 ENCOUNTER — CARE COORDINATION (OUTPATIENT)
Dept: CASE MANAGEMENT | Age: 85
End: 2020-09-16

## 2020-09-16 NOTE — CARE COORDINATION
Covid-19 2nd Outreach call, no answer.   Left VM with contact information and request  for return call at [address], [address] [place] [address] Coordination Transition

## 2020-09-25 ENCOUNTER — HOSPITAL ENCOUNTER (EMERGENCY)
Age: 85
Discharge: HOME | End: 2020-09-25
Attending: EMERGENCY MEDICINE
Payer: MEDICARE

## 2020-09-25 VITALS
OXYGEN SATURATION: 95 % | DIASTOLIC BLOOD PRESSURE: 78 MMHG | RESPIRATION RATE: 22 BRPM | HEART RATE: 95 BPM | SYSTOLIC BLOOD PRESSURE: 180 MMHG | TEMPERATURE: 98.1 F | HEIGHT: 65 IN | BODY MASS INDEX: 24.99 KG/M2 | WEIGHT: 150 LBS

## 2020-09-25 PROCEDURE — 6370000000 HC RX 637 (ALT 250 FOR IP): Performed by: EMERGENCY MEDICINE

## 2020-09-25 PROCEDURE — 99283 EMERGENCY DEPT VISIT LOW MDM: CPT

## 2020-09-25 RX ORDER — LORAZEPAM 0.5 MG/1
0.5 TABLET ORAL ONCE
Status: COMPLETED | OUTPATIENT
Start: 2020-09-25 | End: 2020-09-25

## 2020-09-25 RX ORDER — LORAZEPAM 0.5 MG/1
0.5 TABLET ORAL 2 TIMES DAILY PRN
Qty: 6 TABLET | Refills: 0 | Status: SHIPPED | OUTPATIENT
Start: 2020-09-25 | End: 2020-09-28

## 2020-09-25 RX ADMIN — LORAZEPAM 0.5 MG: 0.5 TABLET ORAL at 20:52

## 2020-09-25 NOTE — ED NOTES
Pt's daughter, Ricci Guo, would like called when she can come back.  [address]  09/25/20 1940

## 2020-09-25 NOTE — ED TRIAGE NOTES
Pt reports panic attacks throughout the day. Pt reports she was seen here in the ED last week for the same complaint. Pt reports she has been out of her prescription Ativan for three days and keeps trying to substitute other medications for it, but nothing is helping.

## 2020-09-26 ASSESSMENT — ENCOUNTER SYMPTOMS
ABDOMINAL DISTENTION: 0
RHINORRHEA: 0
COUGH: 0
EYE REDNESS: 0
SINUS PRESSURE: 0
EYE DISCHARGE: 0
DIARRHEA: 0
BACK PAIN: 0
ABDOMINAL PAIN: 0
CONSTIPATION: 0
WHEEZING: 0
BLOOD IN STOOL: 0
VOMITING: 0
EYE PAIN: 0
NAUSEA: 0
SORE THROAT: 0
SHORTNESS OF BREATH: 1

## 2020-09-26 NOTE — ED PROVIDER NOTES
HPI  Tommy Alcaraz is a 82 y.o. female with a PMHx significant for hypertension, hyperlipidemia, CHF, COPD and major depressive disorder who presents stating she is having a panic attack. The patient states that she suffers from significant anxiety and that she is typically treated with Ativan. She states that she ran out of her Ativan prescription yesterday and the pharmacy stated that she cannot  a renewal until Monday. She describes palpitations, shortness of breath and an overwhelming feeling of anxiety. She states that the palpitations \"just feels like my heart is beating fast, not skipping beats or anything like that. \"  The patient states that this feels like 1 of her \"typical panic attacks. \"  The patient denies recent trauma, fever, chills, fatigue, HA, dizziness, vision changes, congestion, rhinorrhea, neck pain, chest pain, cough, wheezing, abdominal pain, N/V/D/C, hematochezia, melena, dysuria, hematuria, generalized weakness, paresthesias and SI/HI. The patient is currently taking Warfarin. Tobacco Hx:   reports that she has never smoked. She has never used smokeless tobacco.    Alcohol Hx:   reports no history of alcohol use. Illicit Drug Hx:  Reports no history of illicit drug use. The history is provided by the patient. Last Tetanus (if applicable): N/A    Review of Systems   Constitutional: Negative for chills, diaphoresis, fatigue and fever. HENT: Negative for congestion, ear pain, postnasal drip, rhinorrhea, sinus pressure and sore throat. Eyes: Negative for pain, discharge and redness. Respiratory: Positive for shortness of breath. Negative for cough and wheezing. Cardiovascular: Positive for palpitations. Negative for chest pain and leg swelling. Gastrointestinal: Negative for abdominal distention, abdominal pain, blood in stool, constipation, diarrhea, nausea and vomiting.    Genitourinary: Negative for difficulty urinating, dysuria, flank pain and frequency. Musculoskeletal: Negative for arthralgias, back pain, myalgias, neck pain and neck stiffness. Skin: Negative for rash and wound. Neurological: Negative for dizziness, syncope, weakness, light-headedness, numbness and headaches. Hematological: Negative for adenopathy. Psychiatric/Behavioral: Positive for hallucinations (Patient states that she has had hallucinations in the past.  She reports that she thinks she occasionally sees things that are not there in her room at home. ). Negative for self-injury and suicidal ideas. The patient is nervous/anxious. All other systems reviewed and are negative. Physical Exam  Vitals signs and nursing note reviewed. Constitutional:       General: She is awake. She is not in acute distress. Appearance: She is not diaphoretic. HENT:      Head: Normocephalic and atraumatic. Right Ear: External ear normal.      Left Ear: External ear normal.      Nose: Nose normal. No congestion or rhinorrhea. Mouth/Throat:      Mouth: Mucous membranes are moist.      Pharynx: Oropharynx is clear. No oropharyngeal exudate or posterior oropharyngeal erythema. Eyes:      General: No scleral icterus. Right eye: No discharge. Left eye: No discharge. Extraocular Movements: Extraocular movements intact. Conjunctiva/sclera: Conjunctivae normal.   Neck:      Musculoskeletal: Normal range of motion and neck supple. No neck rigidity or muscular tenderness. Cardiovascular:      Rate and Rhythm: Normal rate and regular rhythm. Heart sounds: Normal heart sounds. No murmur. No friction rub. No gallop. Comments: Upper extremity and lower extremity distal pulses intact bilaterally +2/4  Pulmonary:      Effort: Pulmonary effort is normal. No respiratory distress. Breath sounds: Normal breath sounds. No wheezing, rhonchi or rales. Comments: Patient is breathing approximately 22-23 times per minute while I was in the room. Lungs were clear to auscultation bilaterally. Good air movement noted throughout. No signs of respiratory distress. Chest:      Chest wall: No tenderness. Abdominal:      General: Bowel sounds are normal. There is no distension. Palpations: Abdomen is soft. Tenderness: There is no abdominal tenderness. There is no guarding or rebound. Musculoskeletal: Normal range of motion. General: No tenderness or deformity. Right lower leg: No edema. Left lower leg: No edema. Lymphadenopathy:      Cervical: No cervical adenopathy. Skin:     General: Skin is warm and dry. Capillary Refill: Capillary refill takes less than 2 seconds. Findings: No erythema or rash. Neurological:      General: No focal deficit present. Mental Status: She is alert and oriented to person, place, and time. Sensory: No sensory deficit. Motor: No weakness. Coordination: Coordination normal.   Psychiatric:         Attention and Perception: Attention and perception normal.         Mood and Affect: Affect normal. Mood is anxious. Speech: Speech normal.         Behavior: Behavior is cooperative. Thought Content: Thought content does not include homicidal or suicidal ideation. Cognition and Memory: Cognition normal.        ---------------------------------- PAST HISTORY ---------------------------------------------  Past Medical History:  has a past medical history of CHF (congestive heart failure) ([address] Difficult intubation, and Kidney stones. Past Surgical History:  has a past surgical history that includes Cardiac surgery (01/08/2007); Cardiac valve replacement (01/07/07); pacemaker placement; Kidney stone surgery; Cataract removal with implant (Right, 12/10/13); Pacemaker insertion (Left); Colonoscopy; and Cataract removal with implant (Left, 05 10 2016). Social History:  reports that she has never smoked.  She has never used smokeless tobacco. She reports that she does not drink alcohol or use drugs. Family History: family history includes Alcohol Abuse in her father; Mental Illness in her maternal grandmother. Home Meds: Not in a hospital admission. The patients home medications have been reviewed. Allergies: Patient has no known allergies. ------------------------- NURSING NOTES AND VITALS REVIEWED ---------------------------  Date / Time Roomed:  9/25/2020  7:53 PM  ED Bed Assignment:  23/23    The nursing notes within the ED encounter and vital signs as below have been reviewed. BP (!) 180/78   Pulse 95   Temp 98.1 °F (36.7 °C) (Infrared)   Resp 22   Ht 5' 5\" (1.651 m)   Wt 150 lb (68 kg)   SpO2 95%   BMI 24.96 kg/m²   -------------------------------------------------- RESULTS / INTERVENTIONS -------------------------------------------------  All laboratory and radiology tests have been reviewed by this physician. LABS:  No results found for this visit on 09/25/20. RADIOLOGY: Interpreted by Radiologist unless otherwise noted. No orders to display       Oxygen Saturation Interpretation: Normal    Meds Given:  Medications   LORazepam (ATIVAN) tablet 0.5 mg (0.5 mg Oral Given 9/25/20 2052)       Procedures:  No procedures performed. --------------------------------- PROGRESS NOTES / ADDITIONAL PROVIDER NOTES ---------------------------------  Consultations:  As outlined below. ED Course:    ED Course as of Sep 25 2158   Fri Sep 25, 2020   2146 ATTENDING PROVIDER ATTESTATION:     Joo Moses presented to the emergency department for evaluation of [unfilled]  I have reviewed and discussed the case, including pertinent history (medical, surgical, family and social) and exam findings with the Midlevel and the Nurse assigned to Joo Moses. I have personally performed and/or participated in the history, exam, medical decision making, and procedures and agree with all pertinent clinical information.        I have reviewed my findings and recommendations with Simin Meredith and members of family present at the time of disposition. My findings/plan: Patient is a 82-year-old female who presents with a chief complaint of anxiety. Patient states that for the past 4 days she has been out of her Ativan that she has been on for the past 3 years. States that there was a mixup with her prescription refill and she has had increased anxiety since then. The patient states that nothing has changed at home, it is her normal anxiety. She has been taking her normal medications for her anxiety but is out of the Ativan. Patient denies any suicidal homicidal ideations. Denies any chest pain, shortness of breath, abdominal pain, nausea, vomiting. On examination the patient does appear to be mildly anxious. Clear breath sounds in all lung fields. Regular rate and rhythm of the heart. No abdominal tenderness palpation. No lower extremity edema. Patient is awake, alert, oriented x3. No focal neurological deficits. No suicidal homicidal ideations. Lillian West DO      [MS]   2157 On reevaluation, the patient is feeling somewhat better following the dose of Ativan. She was informed that we will discharge her with a short prescription for home to get her through until she can either  her new prescription on Monday or follow-up with her primary physician to have a prescription renewed. The patient verbalized understanding and agreed with that plan. At this time she is stable and safe for discharge. [ML]      ED Course User Index  [ML] Iram Bradley DO  [MS] Hansa Mccord DO       2205: All results were discussed with the patient and I have provided specific details regarding the plan of care, diagnosis and associated prognosis.  The patient tolerated the visit well and, at the time of discharge, she was without objective evidence of hemodynamic instability or an acute process (biological or psychological) requiring hospitalization and inpatient management. She was seen by myself and the assigned attending physician, Dr. Whit Hogan, who agreed with my assessment and plan as laid out herein. The importance of follow-up was discussed at the end of the visit and I recommended that the patient be seen by her primary care physician in 2-3 days. The patient verbalized her understanding and agreement with the plan as presented and stated her intention to follow up with her PCP by calling to schedule an appointment as soon as possible. Reasons to return to the ER or seek immediate evaluation by a medical provider were discussed at length and all questions were answered. The patient was discharged home in stable condition. MDM:  Patient presented from home complaining of panic attacks. The patient stated that she had run out of the medication that she used to treat them and was unable to refill the prescription until Monday. On arrival she was hypertensive and tachypneic with remaining vital signs within normal limits. Physical exam was as documented above. Given the patient's history, presenting symptoms and physical exam findings the decision was made to treat her with her home dose of Ativan. The patient improved significantly following administration of the drug. She was advised to follow-up on Monday with her primary care physician. She was given a prescription for Ativan to get her through until Monday and discharged in stable condition. New Prescriptions    LORAZEPAM (ATIVAN) 0.5 MG TABLET    Take 1 tablet by mouth 2 times daily as needed for Anxiety for up to 3 days. Diagnosis:  1. Anxiety state        Disposition:  Patient's disposition: Discharge to home  Patient's condition is stable. This patient was seen, examined and treated with Dr. Whit Hogan. All aspects of the patient's care were discussed with the attending physician.        Janeth Lee DO  Resident  09/26/20 7340

## 2020-10-08 ENCOUNTER — OFFICE VISIT (OUTPATIENT)
Dept: FAMILY MEDICINE CLINIC | Age: 85
End: 2020-10-08
Payer: MEDICARE

## 2020-10-08 VITALS
OXYGEN SATURATION: 96 % | HEIGHT: 65 IN | RESPIRATION RATE: 18 BRPM | HEART RATE: 72 BPM | WEIGHT: 157 LBS | DIASTOLIC BLOOD PRESSURE: 70 MMHG | TEMPERATURE: 97.7 F | BODY MASS INDEX: 26.16 KG/M2 | SYSTOLIC BLOOD PRESSURE: 128 MMHG

## 2020-10-08 PROCEDURE — G0439 PPPS, SUBSEQ VISIT: HCPCS | Performed by: FAMILY MEDICINE

## 2020-10-08 PROCEDURE — 90694 VACC AIIV4 NO PRSRV 0.5ML IM: CPT | Performed by: FAMILY MEDICINE

## 2020-10-08 PROCEDURE — G0008 ADMIN INFLUENZA VIRUS VAC: HCPCS | Performed by: FAMILY MEDICINE

## 2020-10-08 RX ORDER — AMOXICILLIN 500 MG/1
CAPSULE ORAL
COMMUNITY
Start: 2020-10-05 | End: 2021-08-05

## 2020-10-08 ASSESSMENT — LIFESTYLE VARIABLES
HOW OFTEN DURING THE LAST YEAR HAVE YOU HAD A FEELING OF GUILT OR REMORSE AFTER DRINKING: NEVER
HAS A RELATIVE, FRIEND, DOCTOR, OR ANOTHER HEALTH PROFESSIONAL EXPRESSED CONCERN ABOUT YOUR DRINKING OR SUGGESTED YOU CUT DOWN: 0
HOW OFTEN DURING THE LAST YEAR HAVE YOU BEEN UNABLE TO REMEMBER WHAT HAPPENED THE NIGHT BEFORE BECAUSE YOU HAD BEEN DRINKING: 0
HAVE YOU OR SOMEONE ELSE BEEN INJURED AS A RESULT OF YOUR DRINKING: NO
HOW OFTEN DO YOU HAVE SIX OR MORE DRINKS ON ONE OCCASION: 0
HOW OFTEN DO YOU HAVE SIX OR MORE DRINKS ON ONE OCCASION: NEVER
HOW MANY STANDARD DRINKS CONTAINING ALCOHOL DO YOU HAVE ON A TYPICAL DAY: ONE OR TWO
HAS A RELATIVE, FRIEND, DOCTOR, OR ANOTHER HEALTH PROFESSIONAL EXPRESSED CONCERN ABOUT YOUR DRINKING OR SUGGESTED YOU CUT DOWN: NO
HOW OFTEN DURING THE LAST YEAR HAVE YOU NEEDED AN ALCOHOLIC DRINK FIRST THING IN THE MORNING TO GET YOURSELF GOING AFTER A NIGHT OF HEAVY DRINKING: NEVER
HOW OFTEN DO YOU HAVE A DRINK CONTAINING ALCOHOL: 4
HOW OFTEN DURING THE LAST YEAR HAVE YOU FAILED TO DO WHAT WAS NORMALLY EXPECTED FROM YOU BECAUSE OF DRINKING: 0
HOW OFTEN DURING THE LAST YEAR HAVE YOU FOUND THAT YOU WERE NOT ABLE TO STOP DRINKING ONCE YOU HAD STARTED: 0
HOW OFTEN DURING THE LAST YEAR HAVE YOU BEEN UNABLE TO REMEMBER WHAT HAPPENED THE NIGHT BEFORE BECAUSE YOU HAD BEEN DRINKING: NEVER
HOW MANY STANDARD DRINKS CONTAINING ALCOHOL DO YOU HAVE ON A TYPICAL DAY: 0
HOW OFTEN DURING THE LAST YEAR HAVE YOU FOUND THAT YOU WERE NOT ABLE TO STOP DRINKING ONCE YOU HAD STARTED: NEVER
HOW OFTEN DO YOU HAVE A DRINK CONTAINING ALCOHOL: FOUR OR MORE TIMES A WEEK
HOW OFTEN DURING THE LAST YEAR HAVE YOU NEEDED AN ALCOHOLIC DRINK FIRST THING IN THE MORNING TO GET YOURSELF GOING AFTER A NIGHT OF HEAVY DRINKING: 0
AUDIT TOTAL SCORE: 4
AUDIT TOTAL SCORE: 0
AUDIT-C TOTAL SCORE: 4
HOW OFTEN DURING THE LAST YEAR HAVE YOU HAD A FEELING OF GUILT OR REMORSE AFTER DRINKING: 0
HOW OFTEN DURING THE LAST YEAR HAVE YOU FAILED TO DO WHAT WAS NORMALLY EXPECTED FROM YOU BECAUSE OF DRINKING: NEVER
AUDIT-C TOTAL SCORE: 0
HAVE YOU OR SOMEONE ELSE BEEN INJURED AS A RESULT OF YOUR DRINKING: 0

## 2020-10-08 ASSESSMENT — PATIENT HEALTH QUESTIONNAIRE - PHQ9
2. FEELING DOWN, DEPRESSED OR HOPELESS: 0
SUM OF ALL RESPONSES TO PHQ QUESTIONS 1-9: 0
SUM OF ALL RESPONSES TO PHQ QUESTIONS 1-9: 0
SUM OF ALL RESPONSES TO PHQ9 QUESTIONS 1 & 2: 0
1. LITTLE INTEREST OR PLEASURE IN DOING THINGS: 0

## 2020-10-08 NOTE — PROGRESS NOTES
Vaccine Information Sheet, \"Influenza - Inactivated\"  given to Chiquita Vitale, or parent/legal guardian of  Chiquita Vitale and verbalized understanding. Patient responses:    Have you ever had a reaction to a flu vaccine? No  Are you able to eat eggs without adverse effects? Yes  Do you have any current illness? No  Have you ever had Guillian Kapaa Syndrome? No    Flu vaccine given per order. Please see immunization tab.

## 2020-10-08 NOTE — PROGRESS NOTES
Medicare Annual Wellness Visit  Name: Elda Gonzalez Date: 10/8/2020   MRN: <[id number]> Sex: Female   Age: 82 y.o. Ethnicity: Non-/Non    : 1935 Race: 2000 Rosemarie  is here for Medicare AWV; Health Maintenance (Patient would like to know if shes due for Mammo. Would like flu shot .); and Other (Is patient due for INR? Steffany Jaime )    Screenings for behavioral, psychosocial and functional/safety risks, and cognitive dysfunction are all negative except as indicated below. These results, as well as other patient data from the [address] [facility] form, are documented in Flowsheets linked to this Encounter. No Known Allergies      Prior to Visit Medications    Medication Sig Taking? Authorizing Provider   amoxicillin (AMOXIL) 500 MG capsule take 4 capsules by mouth 1 hour prior to appointment Yes Historical Provider, MD   azithromycin (ZITHROMAX) 250 MG tablet Take 1 tablet by mouth daily Yes Alexandra Jaramillo MD   umeclidinium-vilanterol (ANORO ELLIPTA) 62.5-25 MCG/INH AEPB inhaler Inhale 1 puff into the lungs daily Yes Alexandra Jaramillo MD   RA LAXATIVE & STOOL SOFTENER 8.6-50 MG per tablet take 2 tablets by mouth every evening Yes Historical Provider, MD   warfarin (COUMADIN) 4 MG tablet Take 1 tablet by mouth daily Yes Alexandra Jaramillo MD   rosuvastatin (CRESTOR) 20 MG tablet Take 1 tablet by mouth daily Yes Alexandra Jaramillo MD   pantoprazole (PROTONIX) 40 MG tablet Take 1 tablet by mouth daily Yes Alexandra Jaramillo MD   ketorolac 0.4 % SOLN ophthalmic solution  Yes Historical Provider, MD   venlafaxine (EFFEXOR) 37.5 MG tablet 37.5 mg Every other day Yes Historical Provider, MD   famotidine (PEPCID) 20 MG tablet  Yes Historical Provider, MD   LORazepam (ATIVAN) 0.5 MG tablet Take 0.5 mg by mouth 2 times daily as needed for Anxiety.  . 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    CATARACT REMOVAL WITH IMPLANT Right 12/10/13    CATARACT REMOVAL WITH IMPLANT Left 05 10 2016    Cataract extraction  intraoccular lens implant left eye    COLONOSCOPY      KIDNEY STONE SURGERY      PACEMAKER INSERTION Left     PACEMAKER PLACEMENT           Family History   Problem Relation Age of Onset    Mental Illness Maternal Grandmother     Alcohol Abuse Father        CareTeam (Including outside providers/suppliers regularly involved in providing care):   Patient Care Team:  Eber Giron MD as PCP - General (Family Medicine)  Eber Giron MD as PCP - [facility] Empaneled Provider  Johanna Guerrero as     Wt Readings from Last 3 Encounters:   10/08/20 157 lb (71.2 kg)   09/25/20 150 lb (68 kg)   09/15/20 157 lb (71.2 kg)     Vitals:    10/08/20 1406   BP: 128/70   Pulse: 72   Resp: 18   Temp: 97.7 °F (36.5 °C)   SpO2: 96%   Weight: 157 lb (71.2 kg)   Height: 5' 5\" (1.651 m)     Body mass index is 26.13 kg/m². Based upon direct observation of the patient, evaluation of cognition reveals recent and remote memory intact.     General Appearance: alert and oriented to person, place and time, well developed and well- nourished, in no acute distress  Skin: warm and dry, no rash or erythema  Head: normocephalic and atraumatic  Eyes: pupils equal, round, and reactive to light, extraocular eye movements intact, conjunctivae normal  ENT: tympanic membrane, external ear and ear canal normal bilaterally, nose without deformity, nasal mucosa and turbinates normal without polyps  Neck: supple and non-tender without mass, no thyromegaly or thyroid nodules, no cervical lymphadenopathy  Pulmonary/Chest: clear to auscultation bilaterally- no wheezes, rales or rhonchi, normal air movement, no respiratory distress  Cardiovascular: normal rate, regular rhythm, normal S1 and S2, no murmurs, rubs, clicks, or gallops, distal pulses intact, no carotid bruits  Abdomen: soft, non-tender, non-distended, normal bowel sounds, no masses or organomegaly  Extremities: no cyanosis, clubbing or edema  Musculoskeletal: normal range of motion, no joint swelling, deformity or tenderness  Neurologic: reflexes normal and symmetric, no cranial nerve deficit, gait, coordination and speech normal    Patient's complete Health Risk Assessment and screening values have been reviewed and are found in Flowsheets. The following problems were reviewed today and where indicated follow up appointments were made and/or referrals ordered. Positive Risk Factor Screenings with Interventions:       General Health and ACP:  General  In general, how would you say your health is?: Good  In the past 7 days, have you experienced any of the following?  New or Increased Pain, New or Increased Fatigue, Loneliness, Social Isolation, Stress or Anger?: (!) New or Increased Pain, New or Increased Fatigue, Loneliness, Social Isolation, Stress, Anger  Do you get the social and emotional support that you need?: Yes  Do you have a Living Will?: Yes  Advance Directives     Power of  Living Will ACP-Advance Directive ACP-Power of     Not on File Not on File [place]      General Health Risk Interventions:  · Pain issues: home exercises provided    Health Habits/Nutrition:  Health Habits/Nutrition  Do you exercise for at least 20 minutes 2-3 times per week?: (!) No  Have you lost any weight without trying in the past 3 months?: No  Do you eat fewer than 2 meals per day?: No  Have you seen a dentist within the past year?: (!) No  Body mass index: (!) 26.12  Health Habits/Nutrition Interventions:  · Inadequate physical activity:  educational materials provided to promote increased physical activity    Hearing/Vision:  No exam data present  Hearing/Vision  Do you or your family notice any trouble with your hearing?: (!) Yes  Do you have difficulty driving, watching TV, or doing any of your daily activities because of your eyesight?: No  Have you had an eye exam within the past year?: Yes  Hearing/Vision Interventions:  · Hearing concerns:  patient declines any further evaluation/treatment for hearing issues    Safety:  Safety  Do you have working smoke detectors?: Yes  Have all throw rugs been removed or fastened?: Yes  Do you have non-slip mats or surfaces in all bathtubs/showers?: (!) No  Do all of your stairways have a railing or banister?: Yes  Are your doorways, halls and stairs free of clutter?: Yes  Do you always fasten your seatbelt when you are in a car?: Yes  Safety Interventions:  · Home safety tips provided    Personalized Preventive Plan   Current Health Maintenance Status  Immunization History   Administered Date(s) Administered    DT (pediatric) 08/14/2002    Influenza Vaccine, unspecified formulation 09/17/2012, 08/12/2016    Influenza Virus Vaccine 10/20/2004, 10/28/2005, 12/05/2008, 10/04/2012, 10/15/2013, 08/12/2016    Influenza, High Dose (Fluzone 65 yrs and older) 10/07/2015, 08/30/2016, 08/09/2017, 10/11/2018, 08/23/2019    Influenza, Quadv, adjuvanted, 65 yrs +, IM, PF (Fluad) 10/08/2020    Influenza, Triv, inactivated, subunit, adjuvanted, IM (Fluad 65 yrs and older) 08/09/2017, 08/26/2019    Pneumococcal Conjugate 13-valent (Lodema Tisha) 03/09/2017, 08/09/2017    Pneumococcal Polysaccharide ([id number]) 08/14/2007, 10/25/2012, 06/11/2019    Zoster Live (Zostavax) 10/07/2015        Health Maintenance   Topic Date Due    DTaP/Tdap/Td vaccine (2 - Tdap) 08/14/2012    Shingles Vaccine (2 of 3) 12/02/2015    Annual Wellness Visit (AWV)  05/29/2019    Lipid screen  06/04/2021    Potassium monitoring  09/14/2021    Creatinine monitoring 09/14/2021    Flu vaccine  Completed    Pneumococcal 65+ years Vaccine  Completed    DEXA (modify frequency per FRAX score)  Addressed    Hepatitis A vaccine  Aged Out    Hepatitis B vaccine  Aged Out    Hib vaccine  Aged Out    Meningococcal (ACWY) vaccine  Aged Out     Recommendations for Kid[employer]Shirt Due: see orders and patient instructions/AVS.  . Recommended screening schedule for the next 5-10 years is provided to the patient in written form: see Patient Ondina Marcos was seen today for medicare awv, health maintenance and other.     Diagnoses and all orders for this visit:    Routine general medical examination at a health care facility    Mixed hypercholesterolemia and hypertriglyceridemia    Essential hypertension    Chronic obstructive pulmonary disease, unspecified COPD type ([facility] [address]    Needs flu shot  -     INFLUENZA, QUADV, ADJUVANTED, 65 YRS =, IM, PF, PREFILL SYR, 0.5ML (FLUAD)

## 2020-10-08 NOTE — PATIENT INSTRUCTIONS
LOW SALT AND LOW FAT DIET. CONTINUE CURRENT MEDICATIONS TAKING REGULARLY. REGULAR WALKING ADVISED. INJECTION FLU VACCINE GIVEN TODAY. CALL FOR  ANY ADVERSE REACTION. NEXT APPOINTMENT IN 1 MONTH. Personalized Preventive Plan for Elisabet Arce - 10/8/2020  Medicare offers a range of preventive health benefits. Some of the tests and screenings are paid in full while other may be subject to a deductible, co-insurance, and/or copay. Some of these benefits include a comprehensive review of your medical history including lifestyle, illnesses that may run in your family, and various assessments and screenings as appropriate. After reviewing your medical record and screening and assessments performed today your provider may have ordered immunizations, labs, imaging, and/or referrals for you. A list of these orders (if applicable) as well as your Preventive Care list are included within your After Visit Summary for your review. Other Preventive Recommendations:    · A preventive eye exam performed by an eye specialist is recommended every 1-2 years to screen for glaucoma; cataracts, macular degeneration, and other eye disorders. · A preventive dental visit is recommended every 6 months. · Try to get at least 150 minutes of exercise per week or 10,000 steps per day on a pedometer . · Order or download the FREE \"Exercise & Physical Activity: Your Everyday Guide\" from [employer]. Call 7-778.473.1577 or search [employer] online. · You need 0163-9591 mg of calcium and 7181-3905 IU of vitamin D per day. It is possible to meet your calcium requirement with diet alone, but a vitamin D supplement is usually necessary to meet this goal.  · When exposed to the sun, use a sunscreen that protects against both UVA and UVB radiation with an SPF of 30 or greater. Reapply every 2 to 3 hours or after sweating, drying off with a towel, or swimming.   · Always wear a seat belt when

## 2021-08-04 ENCOUNTER — APPOINTMENT (OUTPATIENT)
Dept: CT IMAGING | Age: 86
End: 2021-08-04
Payer: MEDICARE

## 2021-08-04 ENCOUNTER — HOSPITAL ENCOUNTER (EMERGENCY)
Age: 86
Discharge: HOME | End: 2021-08-04
Attending: EMERGENCY MEDICINE
Payer: MEDICARE

## 2021-08-04 VITALS
TEMPERATURE: 97.9 F | OXYGEN SATURATION: 95 % | HEART RATE: 70 BPM | DIASTOLIC BLOOD PRESSURE: 74 MMHG | SYSTOLIC BLOOD PRESSURE: 143 MMHG | WEIGHT: 157 LBS | BODY MASS INDEX: 26.13 KG/M2 | RESPIRATION RATE: 20 BRPM

## 2021-08-04 DIAGNOSIS — N20.0 NEPHROLITHIASIS: ICD-10-CM

## 2021-08-04 DIAGNOSIS — R10.9 FLANK PAIN: Primary | ICD-10-CM

## 2021-08-04 LAB
ANION GAP SERPL CALCULATED.3IONS-SCNC: 10 MMOL/L (ref 7–16)
BACTERIA: ABNORMAL /HPF
BASOPHILS ABSOLUTE: 0.04 E9/L (ref 0–0.2)
BASOPHILS RELATIVE PERCENT: 0.5 % (ref 0–2)
BILIRUBIN URINE: NEGATIVE
BLOOD, URINE: ABNORMAL
BUN BLDV-MCNC: 12 MG/DL (ref 6–23)
CALCIUM SERPL-MCNC: 9.4 MG/DL (ref 8.6–10.2)
CHLORIDE BLD-SCNC: 102 MMOL/L (ref 98–107)
CLARITY: CLEAR
CO2: 23 MMOL/L (ref 22–29)
COLOR: YELLOW
CREAT SERPL-MCNC: 0.6 MG/DL (ref 0.5–1)
EOSINOPHILS ABSOLUTE: 0.53 E9/L (ref 0.05–0.5)
EOSINOPHILS RELATIVE PERCENT: 7.3 % (ref 0–6)
GFR AFRICAN AMERICAN: >60
GFR NON-AFRICAN AMERICAN: >60 ML/MIN/1.73
GLUCOSE BLD-MCNC: 112 MG/DL (ref 74–99)
GLUCOSE URINE: NEGATIVE MG/DL
HCT VFR BLD CALC: 35.5 % (ref 34–48)
HEMOGLOBIN: 12 G/DL (ref 11.5–15.5)
IMMATURE GRANULOCYTES #: 0.03 E9/L
IMMATURE GRANULOCYTES %: 0.4 % (ref 0–5)
KETONES, URINE: NEGATIVE MG/DL
LEUKOCYTE ESTERASE, URINE: ABNORMAL
LIPASE: 16 U/L (ref 13–60)
LYMPHOCYTES ABSOLUTE: 1.25 E9/L (ref 1.5–4)
LYMPHOCYTES RELATIVE PERCENT: 17.1 % (ref 20–42)
MCH RBC QN AUTO: 32.4 PG (ref 26–35)
MCHC RBC AUTO-ENTMCNC: 33.8 % (ref 32–34.5)
MCV RBC AUTO: 95.9 FL (ref 80–99.9)
MONOCYTES ABSOLUTE: 1.12 E9/L (ref 0.1–0.95)
MONOCYTES RELATIVE PERCENT: 15.4 % (ref 2–12)
NEUTROPHILS ABSOLUTE: 4.32 E9/L (ref 1.8–7.3)
NEUTROPHILS RELATIVE PERCENT: 59.3 % (ref 43–80)
NITRITE, URINE: NEGATIVE
PDW BLD-RTO: 13.1 FL (ref 11.5–15)
PH UA: 6.5 (ref 5–9)
PLATELET # BLD: 220 E9/L (ref 130–450)
PMV BLD AUTO: 10.4 FL (ref 7–12)
POTASSIUM REFLEX MAGNESIUM: 4.5 MMOL/L (ref 3.5–5)
PROTEIN UA: NEGATIVE MG/DL
RBC # BLD: 3.7 E12/L (ref 3.5–5.5)
RBC UA: ABNORMAL /HPF (ref 0–2)
SODIUM BLD-SCNC: 135 MMOL/L (ref 132–146)
SPECIFIC GRAVITY UA: 1.01 (ref 1–1.03)
UROBILINOGEN, URINE: 0.2 E.U./DL
WBC # BLD: 7.3 E9/L (ref 4.5–11.5)
WBC UA: ABNORMAL /HPF (ref 0–5)

## 2021-08-04 PROCEDURE — 99284 EMERGENCY DEPT VISIT MOD MDM: CPT

## 2021-08-04 PROCEDURE — 85025 COMPLETE CBC W/AUTO DIFF WBC: CPT

## 2021-08-04 PROCEDURE — 83690 ASSAY OF LIPASE: CPT

## 2021-08-04 PROCEDURE — 87088 URINE BACTERIA CULTURE: CPT

## 2021-08-04 PROCEDURE — 80048 BASIC METABOLIC PNL TOTAL CA: CPT

## 2021-08-04 PROCEDURE — 81001 URINALYSIS AUTO W/SCOPE: CPT

## 2021-08-04 PROCEDURE — 36415 COLL VENOUS BLD VENIPUNCTURE: CPT

## 2021-08-04 PROCEDURE — 74176 CT ABD & PELVIS W/O CONTRAST: CPT

## 2021-08-04 ASSESSMENT — ENCOUNTER SYMPTOMS
WHEEZING: 0
VOMITING: 0
CHOKING: 0
SORE THROAT: 0
SINUS PAIN: 0
ABDOMINAL PAIN: 0
CHEST TIGHTNESS: 0
NAUSEA: 0
DIARRHEA: 0
EYE PAIN: 0
SHORTNESS OF BREATH: 0
COUGH: 0

## 2021-08-04 ASSESSMENT — PAIN SCALES - GENERAL: PAINLEVEL_OUTOF10: 5

## 2021-08-04 NOTE — ED PROVIDER NOTES
ED  Provider Note  Admit Date/RoomTime: 8/4/2021  8:06 AM  ED Room: 03/03     HPI:   Carrillo Cheney is a 82 y.o. female presenting to the ED for flank pain, beginning last night ago. History comes primarily from the patient. Past medical history includes prior kidney stones, hypertension, COPD. The complaint has been persistent, mild in severity, improved by nothing and worsened by nothing. Associated symptoms include none. Teresa Alva states that she was awoken in the middle the night with right-sided flank pain that felt very similar to prior kidney stones in the past.  This pain persisted throughout the evening until it spontaneously resolved at approximately 7:00 this morning. Concerned that she may have had a kidney stone, she presented to [address] [place], [place] [address] emergency department for further evaluation and treatment. On arrival, the patient was assessed with history, physical exam, imaging studies, laboratory studies, vital signs. Vital signs were stable on arrival and the patient was afebrile. Review of Systems   Constitutional: Negative for appetite change, chills and fever. HENT: Negative for sinus pain and sore throat. Eyes: Negative for pain and visual disturbance. Respiratory: Negative for cough, choking, chest tightness, shortness of breath and wheezing. Cardiovascular: Negative for chest pain and palpitations. Gastrointestinal: Negative for abdominal pain, diarrhea, nausea and vomiting. Genitourinary: Positive for flank pain (now resolved). Negative for hematuria. Musculoskeletal: Negative for neck pain and neck stiffness. Skin: Negative for rash. Neurological: Negative for tremors, syncope and weakness. Psychiatric/Behavioral: Negative for confusion. Physical Exam  Vitals and nursing note reviewed. Constitutional:       Appearance: She is well-developed. HENT:      Head: Normocephalic and atraumatic.    Eyes:      Pupils: Pupils are equal, round, and reactive to light. Cardiovascular:      Rate and Rhythm: Normal rate and regular rhythm. Pulmonary:      Effort: Pulmonary effort is normal. No respiratory distress. Breath sounds: Normal breath sounds. No wheezing or rales. Abdominal:      General: Bowel sounds are normal.      Palpations: Abdomen is soft. Tenderness: There is no abdominal tenderness. There is no guarding or rebound. Musculoskeletal:      Cervical back: Normal range of motion and neck supple. Skin:     General: Skin is warm and dry. Neurological:      Mental Status: She is alert and oriented to person, place, and time. Cranial Nerves: No cranial nerve deficit. Coordination: Coordination normal.          Procedures     MDM  Number of Diagnoses or Management Options  Flank pain  Nephrolithiasis  Diagnosis management comments: Emergency Department evaluation was notable for findings of kidney stones on her CT scan however no ureterolithiasis is present. She had microscopic hematuria findings as on her urinalysis without evidence of infectious process. The patient denies any sort of foul-smelling urine, dysuria, or other symptoms of acute cystitis. Patient likely passed a small kidney stone over the course the evening which led to her symptoms. She was given the name of a urologist to follow-up with should she have recurrence of her symptoms and was advised return should her symptoms return and worsen. She understood this plan of care was amenable to the plan. Vital signs have been stable throughout the entirety of her emergency department stay. ED Course as of Aug 04 1006   Wed Aug 04, 2021   [id number] ATTENDING PROVIDER ATTESTATION:     I have personally performed and/or participated in the history, exam, medical decision making, and procedures and agree with all pertinent clinical information unless otherwise noted.       I have also reviewed and agree with the past medical, family and social history unless otherwise noted. I have discussed this patient in detail with the resident, and provided the instruction and education regarding patient here complaining of an episode of right flank pain this morning. No vomiting or diarrhea. States was sharp and crampy and felt like her previous kidney stones but not as bad and symptoms have since resolved. No current symptoms, no current pain. No episodes of chest pain, palpitations or shortness of breath. No recent fevers, sweats or chills and no recent hematuria or dysuria. No treatment prior travel, no current symptoms. My findings/plan: Patient resting comfortably in the bed in no distress. Heart rate regular, lungs are clear and equal.  Abdomen soft and nontender. No CVA tenderness. No right upper quadrant pain. No jaundice or icterus. No pulsatile masses. [NC]      ED Course User Index  [NC] Ondina Марина Powers, DO       --------------------------------------------- PAST HISTORY ---------------------------------------------  Past Medical History:  has a past medical history of CHF (congestive heart failure) ([facility] [address] Difficult intubation, and Kidney stones. Past Surgical History:  has a past surgical history that includes Cardiac surgery (01/08/2007); Cardiac valve replacement (01/07/07); pacemaker placement; Kidney stone surgery; Cataract removal with implant (Right, 12/10/13); Pacemaker insertion (Left); Colonoscopy; and Cataract removal with implant (Left, 05 10 2016). Social History:  reports that she has never smoked. She has never used smokeless tobacco. She reports that she does not drink alcohol and does not use drugs. Family History: family history includes Alcohol Abuse in her father; Mental Illness in her maternal grandmother. The patients home medications have been reviewed. Allergies: Patient has no known allergies.     -------------------------------------------------- RESULTS -------------------------------------------------  Labs:  No results found for this visit on 08/04/21. Radiology:  CT ABDOMEN PELVIS WO CONTRAST Additional Contrast? None    (Results Pending)       ------------------------- NURSING NOTES AND VITALS REVIEWED ---------------------------  Date / Time Roomed:  8/4/2021  8:06 AM  ED Bed Assignment:  03/03    The nursing notes within the ED encounter and vital signs as below have been reviewed. BP (!) 147/68   Pulse 78   Temp 97.9 °F (36.6 °C) (Tympanic)   Resp 20   SpO2 95%   Oxygen Saturation Interpretation: Normal      ------------------------------------------ PROGRESS NOTES ------------------------------------------  8:31 AM EDT  I have spoken with the patient and discussed todays results, in addition to providing specific details for the plan of care and counseling regarding the diagnosis and prognosis. Their questions are answered at this time and they are agreeable with the plan. I discussed at length with them reasons for immediate return here for re evaluation. They will followup with their primary care physician by calling their office in 2 days if symptoms worsen. --------------------------------- ADDITIONAL PROVIDER NOTES ---------------------------------  At this time the patient is without objective evidence of an acute process requiring hospitalization or inpatient management. They have remained hemodynamically stable throughout their entire ED visit and are stable for discharge with outpatient follow-up. The plan has been discussed in detail and they are aware of the specific conditions for emergent return, as well as the importance of follow-up. New Prescriptions    No medications on file       Diagnosis:  No diagnosis found. Disposition:  Patient's disposition: Discharge to home  Patient's condition is stable.     ED Course as of Aug 04 1006   Wed Aug 04, 2021   [id number] ATTENDING PROVIDER ATTESTATION:     I have personally performed and/or participated in the history, exam, medical decision making, and procedures and agree with all pertinent clinical information unless otherwise noted. I have also reviewed and agree with the past medical, family and social history unless otherwise noted. I have discussed this patient in detail with the resident, and provided the instruction and education regarding patient here complaining of an episode of right flank pain this morning. No vomiting or diarrhea. States was sharp and crampy and felt like her previous kidney stones but not as bad and symptoms have since resolved. No current symptoms, no current pain. No episodes of chest pain, palpitations or shortness of breath. No recent fevers, sweats or chills and no recent hematuria or dysuria. No treatment prior travel, no current symptoms. My findings/plan: Patient resting comfortably in the bed in no distress. Heart rate regular, lungs are clear and equal.  Abdomen soft and nontender. No CVA tenderness. No right upper quadrant pain. No jaundice or icterus. No pulsatile masses.           [NC]      ED Course User Index  [NC] [facility], DO          [address]  [address]., DO  Resident  08/04/21 1021

## 2021-08-05 ENCOUNTER — OFFICE VISIT (OUTPATIENT)
Dept: FAMILY MEDICINE CLINIC | Age: 86
End: 2021-08-05
Payer: MEDICARE

## 2021-08-05 VITALS
SYSTOLIC BLOOD PRESSURE: 158 MMHG | BODY MASS INDEX: 25.21 KG/M2 | HEART RATE: 74 BPM | RESPIRATION RATE: 16 BRPM | DIASTOLIC BLOOD PRESSURE: 60 MMHG | OXYGEN SATURATION: 96 % | WEIGHT: 151.3 LBS | TEMPERATURE: 97.5 F | HEIGHT: 65 IN

## 2021-08-05 DIAGNOSIS — J44.9 CHRONIC OBSTRUCTIVE PULMONARY DISEASE, UNSPECIFIED COPD TYPE (HCC): ICD-10-CM

## 2021-08-05 DIAGNOSIS — E78.2 MIXED HYPERCHOLESTEROLEMIA AND HYPERTRIGLYCERIDEMIA: Primary | ICD-10-CM

## 2021-08-05 DIAGNOSIS — Z95.2 S/P MVR (MITRAL VALVE REPLACEMENT): ICD-10-CM

## 2021-08-05 DIAGNOSIS — Z79.01 ANTICOAGULATION MANAGEMENT ENCOUNTER: ICD-10-CM

## 2021-08-05 DIAGNOSIS — N20.0 NEPHROLITHIASIS: ICD-10-CM

## 2021-08-05 DIAGNOSIS — F33.1 MAJOR DEPRESSIVE DISORDER, RECURRENT EPISODE, MODERATE (HCC): ICD-10-CM

## 2021-08-05 DIAGNOSIS — Z51.81 ANTICOAGULATION MANAGEMENT ENCOUNTER: ICD-10-CM

## 2021-08-05 DIAGNOSIS — I10 ESSENTIAL HYPERTENSION: ICD-10-CM

## 2021-08-05 PROCEDURE — 99214 OFFICE O/P EST MOD 30 MIN: CPT | Performed by: FAMILY MEDICINE

## 2021-08-05 NOTE — PROGRESS NOTES
OFFICE PROGRESS NOTE      SUBJECTIVE:        Patient ID:   Dk Naidu is a 82 y.o. female who presents for   Chief Complaint   Patient presents with    Follow-Up from Hospital     had kidney stones and went to ER; pt states since the kidney stone episode she has been very fatigue and sleeping a lot during the day and night           HPI:     WAS SEEN YESTERDAY AT [place] [id number]. DIAGNOSED TO HAVE KIDNEY STONES. HAS BEEN REFERRED TO DR. Nikki Rodriguez  FOR FURTHER CARE. ALSO SEEN AT [facility] FOR VALVA PROBLEM. WARFARIN CHANGED TO ELIQUIS NOW. FEELS VERY SLEEPY AT TIMES. ON SEROQUEL, EFFEXOR AND ATIVAN FOR MOOD CONTROL. WATCHING DIET GOOD. WALKING SOME FOR EXERCISE. TAKING MEDICATIONS REGULARLY. Prior to Admission medications    Medication Sig Start Date End Date Taking? Authorizing Provider   apixaban (ELIQUIS) 5 MG TABS tablet Take 1 tablet by mouth 2 times daily 8/5/21  Yes Rob Stevens MD   umeclidinium-vilanterol [facility] ELLIPTA) 62.5-25 MCG/INH AEPB inhaler Inhale 1 puff into the lungs daily 9/15/20  Yes Rob Stevens MD   RA LAXATIVE & STOOL SOFTENER 8.6-50 MG per tablet take 2 tablets by mouth every evening 8/11/20  Yes Historical Provider, MD   rosuvastatin (CRESTOR) 20 MG tablet Take 1 tablet by mouth daily 6/11/20  Yes Rob Stevens MD   pantoprazole (PROTONIX) 40 MG tablet Take 1 tablet by mouth daily 6/11/20  Yes Rob Stevens MD   ketorolac 0.4 % SOLN ophthalmic solution  1/15/20  Yes Historical Provider, MD   venlafaxine (EFFEXOR) 37.5 MG tablet 37.5 mg Every other day 1/25/20  Yes Historical Provider, MD   famotidine (PEPCID) 20 MG tablet  11/10/19  Yes Historical Provider, MD   LORazepam (ATIVAN) 0.5 MG tablet Take 0.5 mg by mouth 2 times daily as needed for Anxiety.  . 6/15/16  Yes Historical Provider, MD   QUEtiapine (SEROQUEL) 300 MG tablet Take 300 mg by mouth nightly  2/28/16  Yes Historical Provider, MD     Social History Socioeconomic History    Marital status:      Spouse name: None    Number of children: None    Years of education: None    Highest education level: None   Occupational History    None   Tobacco Use    Smoking status: Never Smoker    Smokeless tobacco: Never Used   Vaping Use    Vaping Use: Never used   Substance and Sexual Activity    Alcohol use: No     Comment: RARELY    Drug use: No    Sexual activity: Not Currently     Partners: Male   Other Topics Concern    None   Social History Narrative    None     Social Determinants of Health     Financial Resource Strain:     Difficulty of Paying Living Expenses:    Food Insecurity:     Worried About Running Out of Food in the Last Year:     Ran Out of Food in the Last Year:    Transportation Needs:     Lack of Transportation (Medical):      Lack of Transportation (Non-Medical):    Physical Activity:     Days of Exercise per Week:     Minutes of Exercise per Session:    Stress:     Feeling of Stress :    Social Connections:     Frequency of Communication with Friends and Family:     Frequency of Social Gatherings with Friends and Family:     Attends Anab[employer]ist Services:     Active Member of Clubs or Organizations:     Attends Club or Organization Meetings:     Marital Status:    Intimate Partner Violence:     Fear of Current or Ex-Partner:     Emotionally Abused:     Physically Abused:     Sexually Abused:        I have reviewed Zacks allergies, medications, problem list, medical, social and family history and have updated as needed in the electronic medical record  Review Of Systems:    Skin: no abnormal pigmentation, rash, scaling, itching, masses, hair or nail changes  Eyes: no blurring, diplopia, or eye pain  Ears/Nose/Throat: no hearing loss, tinnitus, vertigo, nosebleed, nasal congestion, rhinorrhea, sore throat  Respiratory: no cough, pleuritic chest pain, dyspnea, or wheezing  Cardiovascular: no chest pain, angina, dyspnea on exertion, orthopnea, PND, palpitations, or claudication  Gastrointestinal: no nausea, vomiting, heartburn, diarrhea, constipation, bloating,  abdominal pain, or blood per rectum. Appetite is good  Genitourinary: no urinary urgency, frequency, dysuria, nocturia, hesitancy, or incontinence  Musculoskeletal: joint pains off and on. Morning stiffness. Ambulating well  Neurologic: no paralysis, paresis, paresthesia, seizures, tremors, or headaches  Hematologic/Lymphatic/Immunologic: no anemia, abnormal bleeding/bruising, fever, chills, night sweats, swollen glands, or unexplained weight loss  Endocrine: no heat or cold intolerance and no polyphagia, polydipsia, or polyuria        OBJECTIVE:     VS:  Wt Readings from Last 3 Encounters:   08/05/21 151 lb 4.8 oz (68.6 kg)   08/04/21 157 lb (71.2 kg)   10/08/20 157 lb (71.2 kg)     Temp Readings from Last 3 Encounters:   08/05/21 97.5 °F (36.4 °C) (Temporal)   08/04/21 97.9 °F (36.6 °C) (Tympanic)   10/08/20 97.7 °F (36.5 °C)     BP Readings from Last 3 Encounters:   08/05/21 (!) 158/60   08/04/21 (!) 143/74   10/08/20 128/70        General appearance: Alert, Awake, Oriented times 3, no distress  Skin: Warm and dry  Head: Normocephalic. No masses, lesions or tenderness noted  Eyes: Conjunctivae appear normal. PERLE  Ears: External ears normal  Nose/Sinuses: Nares normal. Septum midline. Mucosa normal. No drainage  Oropharynx: Oropharynx clear with no exudate seen  Neck: Neck supple. No jugular venous distension, lymphadenopathy or thyromegaly Trachea midline  Chest:  Normal. Movements are Normal and Equal.  Lungs: Lungs clear to auscultation bilaterally. No ronchi, crackles or wheezes  Heart: S1 S2  Regular rate and rhythm. No rub, murmur or gallop  Abdomen: Abdomen soft, non-tender. BS normal. No masses, organomegaly. Back: Grossly Normal and Equal. DTR are Normal. SLR is Normal.  Extremities: Arthritic changes are noted. Movements are limited.  Pedal pulses are normal.  Musculoskeletal: Muscular strength appears intact. No joint effusion, tenderness, swelling or warmth  Neuro:  No focal motor or sensory deficits        ASSESSMENT     Patient Active Problem List    Diagnosis Date Noted    S/P MVR (mitral valve replacement) 07/11/2013    Essential hypertension 10/25/2012    Major depressive disorder, recurrent episode, moderate ([facility] 10/25/2012    Mixed hypercholesterolemia and hypertriglyceridemia 08/30/2012    [facility] 09/15/2020    Anticoagulation management encounter 12/22/2016        Diagnosis:     ICD-10-CM    1. Mixed hypercholesterolemia and hypertriglyceridemia  E78.2 Comprehensive Metabolic Panel     Lipid Panel    ? CONTROL   2. Essential hypertension  I10     CONTROLLED   3. Chronic obstructive pulmonary disease, unspecified COPD type ([facility]  J44.9     STABLE   4. Nephrolithiasis  N20.0     NEW FINDING. 5. Major depressive disorder, recurrent episode, moderate (HCC)  F33.1     STABLE   6. Anticoagulation management encounter  Z51.81     Z79.01     STABLE   7. S/P MVR (mitral valve replacement)  Z95.2     STABLE       PLAN:           Patient Instructions   LOW SALT,LOW CARB. AND LOW FAT DIET. CONTINUE CURRENT MEDICATIONS TAKING REGULARLY. OK TO STOP WARFARIN. STAY ON ELIQUIS AS PER [facility]. ADVISED TO STAY OFF LORAZEPAM.    REGULAR WALKING ADVISED. FASTING FOR LAB WORK ONE MORNING,   NEXT APPOINTMENT IN 1 MONTH. Return in about 1 month (around 9/5/2021) for FOLLOW UP VISIT. I have reviewed my findings and recommendations with Lizzeth Forte.     Electronically signed by Apple Nova MD on 8/5/21 at 3:24 PM EDT

## 2021-08-05 NOTE — PATIENT INSTRUCTIONS
LOW SALT,LOW CARB. AND LOW FAT DIET. CONTINUE CURRENT MEDICATIONS TAKING REGULARLY. OK TO STOP WARFARIN. STAY ON ELIQUIS AS PER [facility]. ADVISED TO STAY OFF LORAZEPAM.    REGULAR WALKING ADVISED. FASTING FOR LAB WORK ONE MORNING,   NEXT APPOINTMENT IN 1 MONTH.

## 2021-08-06 LAB — URINE CULTURE, ROUTINE: NORMAL

## 2021-08-07 ASSESSMENT — ENCOUNTER SYMPTOMS
SINUS PAIN: 0
SHORTNESS OF BREATH: 0
ABDOMINAL PAIN: 0
WHEEZING: 0
CHEST TIGHTNESS: 0
NAUSEA: 0
COUGH: 0
SORE THROAT: 0
CHOKING: 0
VOMITING: 0
EYE PAIN: 0
DIARRHEA: 0

## 2021-08-08 NOTE — ED PROVIDER NOTES
ED  Provider Note  Admit Date/RoomTime: 8/4/2021  8:06 AM  ED Room: 03/03     HPI:   Antoni Kwok is a 82 y.o. female presenting to the ED for flank pain, beginning last night ago. History comes primarily from the patient. Past medical history includes prior kidney stones, hypertension, COPD. The complaint has been persistent, mild in severity, improved by nothing and worsened by nothing. Associated symptoms include none. Ede Truong states that she was awoken in the middle the night with right-sided flank pain that felt very similar to prior kidney stones in the past.  This pain persisted throughout the evening until it spontaneously resolved at approximately 7:00 this morning. Concerned that she may have had a kidney stone, she presented to [address] [place], [place] [address] emergency department for further evaluation and treatment. On arrival, the patient was assessed with history, physical exam, imaging studies, laboratory studies, vital signs. Vital signs were stable on arrival and the patient was afebrile. Flank Pain  Pertinent negatives include no chest pain, no abdominal pain and no shortness of breath. Review of Systems   Constitutional: Negative for appetite change, chills and fever. HENT: Negative for sinus pain and sore throat. Eyes: Negative for pain and visual disturbance. Respiratory: Negative for cough, choking, chest tightness, shortness of breath and wheezing. Cardiovascular: Negative for chest pain and palpitations. Gastrointestinal: Negative for abdominal pain, diarrhea, nausea and vomiting. Genitourinary: Positive for flank pain. Negative for hematuria. Musculoskeletal: Negative for neck pain and neck stiffness. Skin: Negative for rash. Neurological: Negative for tremors, syncope and weakness. Psychiatric/Behavioral: Negative for confusion. Physical Exam  Vitals and nursing note reviewed. Constitutional:       Appearance: She is well-developed.    HENT: Head: Normocephalic and atraumatic. Eyes:      Pupils: Pupils are equal, round, and reactive to light. Cardiovascular:      Rate and Rhythm: Normal rate and regular rhythm. Pulmonary:      Effort: Pulmonary effort is normal. No respiratory distress. Breath sounds: Normal breath sounds. No wheezing or rales. Abdominal:      General: Bowel sounds are normal.      Palpations: Abdomen is soft. Tenderness: There is no abdominal tenderness. There is no guarding or rebound. Musculoskeletal:      Cervical back: Normal range of motion and neck supple. Skin:     General: Skin is warm and dry. Neurological:      Mental Status: She is alert and oriented to person, place, and time. Cranial Nerves: No cranial nerve deficit. Coordination: Coordination normal.          Procedures     MDM  Number of Diagnoses or Management Options  Flank pain  Nephrolithiasis  Diagnosis management comments: Emergency Department evaluation was notable for findings of kidney stones on her CT scan however no ureterolithiasis is present. She had microscopic hematuria findings as on her urinalysis without evidence of infectious process. The patient denies any sort of foul-smelling urine, dysuria, or other symptoms of acute cystitis. Patient likely passed a small kidney stone over the course the evening which led to her symptoms. She was given the name of a urologist to follow-up with should she have recurrence of her symptoms and was advised return should her symptoms return and worsen. She understood this plan of care was amenable to the plan. Vital signs have been stable throughout the entirety of her emergency department stay.          ED Course as of Aug 07 2104   Wed Aug 04, 2021   [id number] ATTENDING PROVIDER ATTESTATION:     I have personally performed and/or participated in the history, exam, medical decision making, and procedures and agree with all pertinent clinical information unless otherwise noted. I have also reviewed and agree with the past medical, family and social history unless otherwise noted. I have discussed this patient in detail with the resident, and provided the instruction and education regarding patient here complaining of an episode of right flank pain this morning. No vomiting or diarrhea. States was sharp and crampy and felt like her previous kidney stones but not as bad and symptoms have since resolved. No current symptoms, no current pain. No episodes of chest pain, palpitations or shortness of breath. No recent fevers, sweats or chills and no recent hematuria or dysuria. No treatment prior travel, no current symptoms. My findings/plan: Patient resting comfortably in the bed in no distress. Heart rate regular, lungs are clear and equal.  Abdomen soft and nontender. No CVA tenderness. No right upper quadrant pain. No jaundice or icterus. No pulsatile masses. [NC]      ED Course User Index  [NC] Felicia Powers, DO       --------------------------------------------- PAST HISTORY ---------------------------------------------  Past Medical History:  has a past medical history of CHF (congestive heart failure) ([facility] Difficult intubation, and Kidney stones. Past Surgical History:  has a past surgical history that includes Cardiac surgery (01/08/2007); Cardiac valve replacement (01/07/07); pacemaker placement; Kidney stone surgery; Cataract removal with implant (Right, 12/10/13); Pacemaker insertion (Left); Colonoscopy; and Cataract removal with implant (Left, 05 10 2016). Social History:  reports that she has never smoked. She has never used smokeless tobacco. She reports that she does not drink alcohol and does not use drugs. Family History: family history includes Alcohol Abuse in her father; Mental Illness in her maternal grandmother. The patients home medications have been reviewed.     Allergies: Patient has no known Negative    pH, UA 6.5 5.0 - 9.0    Protein, UA Negative Negative mg/dL    Urobilinogen, Urine 0.2 <2.0 E.U./dL    Nitrite, Urine Negative Negative    Leukocyte Esterase, Urine MODERATE (A) Negative   Lipase   Result Value Ref Range    Lipase 16 13 - 60 U/L   Microscopic Urinalysis   Result Value Ref Range    WBC, UA 2-5 0 - 5 /HPF    RBC, UA 0-1 0 - 2 /HPF    Bacteria, UA RARE (A) None Seen /HPF       Radiology:  CT ABDOMEN PELVIS WO CONTRAST Additional Contrast? None   Final Result   1. There are no findings of obstructive uropathy. 2. Nonobstructing bilateral renal calculi. 3. Moderate sized hiatal hernia. 4. Mild sigmoid diverticulosis. There are no findings of diverticulitis. 5. There is no acute intra-abdominal or intrapelvic pathology. ------------------------- NURSING NOTES AND VITALS REVIEWED ---------------------------  Date / Time Roomed:  8/4/2021  8:06 AM  ED Bed Assignment:  03/03    The nursing notes within the ED encounter and vital signs as below have been reviewed. BP (!) 143/74   Pulse 70   Temp 97.9 °F (36.6 °C) (Tympanic)   Resp 20   Wt 157 lb (71.2 kg)   SpO2 95%   BMI 26.13 kg/m²   Oxygen Saturation Interpretation: Normal      ------------------------------------------ PROGRESS NOTES ------------------------------------------  8:31 AM EDT  I have spoken with the patient and discussed todays results, in addition to providing specific details for the plan of care and counseling regarding the diagnosis and prognosis. Their questions are answered at this time and they are agreeable with the plan. I discussed at length with them reasons for immediate return here for re evaluation. They will followup with their primary care physician by calling their office in 2 days if symptoms worsen.       --------------------------------- ADDITIONAL PROVIDER NOTES ---------------------------------  At this time the patient is without objective evidence of an acute process requiring hospitalization or inpatient management. They have remained hemodynamically stable throughout their entire ED visit and are stable for discharge with outpatient follow-up. The plan has been discussed in detail and they are aware of the specific conditions for emergent return, as well as the importance of follow-up. Discharge Medication List as of 8/4/2021 10:24 AM          Diagnosis:  1. Flank pain    2. Nephrolithiasis        Disposition:  Patient's disposition: Discharge to home  Patient's condition is stable. ED Course as of Aug 07 2104   Wed Aug 04, 2021   [id number] ATTENDING PROVIDER ATTESTATION:     I have personally performed and/or participated in the history, exam, medical decision making, and procedures and agree with all pertinent clinical information unless otherwise noted. I have also reviewed and agree with the past medical, family and social history unless otherwise noted. I have discussed this patient in detail with the resident, and provided the instruction and education regarding patient here complaining of an episode of right flank pain this morning. No vomiting or diarrhea. States was sharp and crampy and felt like her previous kidney stones but not as bad and symptoms have since resolved. No current symptoms, no current pain. No episodes of chest pain, palpitations or shortness of breath. No recent fevers, sweats or chills and no recent hematuria or dysuria. No treatment prior travel, no current symptoms. My findings/plan: Patient resting comfortably in the bed in no distress. Heart rate regular, lungs are clear and equal.  Abdomen soft and nontender. No CVA tenderness. No right upper quadrant pain. No jaundice or icterus. No pulsatile masses.           [NC]      ED Course User Index  [NC] DO Khang Shelton DO  Resident  08/04/21 Jimmy Mendoza DO  Resident  08/07/21 2105

## 2021-11-08 ENCOUNTER — HOSPITAL ENCOUNTER (OUTPATIENT)
Dept: MAMMOGRAPHY | Age: 86
Discharge: HOME | End: 2021-11-10
Payer: MEDICARE

## 2021-11-08 VITALS — BODY MASS INDEX: 25.16 KG/M2 | WEIGHT: 151 LBS | HEIGHT: 65 IN

## 2021-11-08 DIAGNOSIS — Z12.31 OTHER SCREENING MAMMOGRAM: ICD-10-CM

## 2021-11-08 PROCEDURE — 77067 SCR MAMMO BI INCL CAD: CPT

## 2021-12-23 NOTE — TELEPHONE ENCOUNTER
----- Message from Mechelle Esquivel sent at 12/23/2021  8:27 AM EST -----  Subject: Refill Request    QUESTIONS  Name of Medication? apixaban (ELIQUIS) 5 MG TABS tablet  Patient-reported dosage and instructions? 5mg  How many days do you have left? 4  Preferred Pharmacy? [id number] [address] phone number (if available)? [phone]  ---------------------------------------------------------------------------  --------------  CALL BACK INFO  What is the best way for the office to contact you? OK to leave message on   voicemail  Preferred Call Back Phone Number?  [phone]

## 2022-03-07 ENCOUNTER — HOSPITAL ENCOUNTER (OUTPATIENT)
Dept: CT IMAGING | Age: 87
Discharge: HOME | End: 2022-03-07
Payer: MEDICARE

## 2022-03-07 DIAGNOSIS — R10.11 ABDOMINAL PAIN, RIGHT UPPER QUADRANT: ICD-10-CM

## 2022-03-07 DIAGNOSIS — R10.31 ABDOMINAL PAIN, RIGHT LOWER QUADRANT: ICD-10-CM

## 2022-03-07 PROCEDURE — 74176 CT ABD & PELVIS W/O CONTRAST: CPT

## 2022-04-19 RX ORDER — APIXABAN 5 MG/1
TABLET, FILM COATED ORAL
Qty: 60 TABLET | Refills: 3 | OUTPATIENT
Start: 2022-04-19

## 2022-04-19 NOTE — TELEPHONE ENCOUNTER
who's patient is this ? Balaji as PCP last visit Miguel Jimenez call the patient and find out who she is seeing if Miguel send to [facility]erns he is covering

## 2022-12-03 ENCOUNTER — HOSPITAL ENCOUNTER (OUTPATIENT)
Dept: MAMMOGRAPHY | Age: 87
Discharge: HOME | End: 2022-12-03
Payer: MEDICARE

## 2022-12-03 DIAGNOSIS — Z12.31 ENCOUNTER FOR SCREENING MAMMOGRAM FOR MALIGNANT NEOPLASM OF BREAST: ICD-10-CM

## 2022-12-03 PROCEDURE — 77067 SCR MAMMO BI INCL CAD: CPT

## 2022-12-25 ENCOUNTER — HOSPITAL ENCOUNTER (EMERGENCY)
Age: 87
Discharge: HOME | End: 2022-12-25
Attending: EMERGENCY MEDICINE
Payer: MEDICARE

## 2022-12-25 ENCOUNTER — APPOINTMENT (OUTPATIENT)
Dept: GENERAL RADIOLOGY | Age: 87
End: 2022-12-25
Payer: MEDICARE

## 2022-12-25 VITALS
TEMPERATURE: 99.5 F | SYSTOLIC BLOOD PRESSURE: 179 MMHG | RESPIRATION RATE: 18 BRPM | HEART RATE: 74 BPM | OXYGEN SATURATION: 97 % | DIASTOLIC BLOOD PRESSURE: 82 MMHG

## 2022-12-25 DIAGNOSIS — R09.89 CHEST CONGESTION: Primary | ICD-10-CM

## 2022-12-25 LAB
ALBUMIN SERPL-MCNC: 4.4 G/DL (ref 3.5–5.2)
ALP BLD-CCNC: 64 U/L (ref 35–104)
ALT SERPL-CCNC: 14 U/L (ref 0–32)
ANION GAP SERPL CALCULATED.3IONS-SCNC: 9 MMOL/L (ref 7–16)
AST SERPL-CCNC: 19 U/L (ref 0–31)
BASOPHILS ABSOLUTE: 0.03 E9/L (ref 0–0.2)
BASOPHILS RELATIVE PERCENT: 0.4 % (ref 0–2)
BILIRUB SERPL-MCNC: 0.3 MG/DL (ref 0–1.2)
BUN BLDV-MCNC: 23 MG/DL (ref 6–23)
CALCIUM SERPL-MCNC: 10.7 MG/DL (ref 8.6–10.2)
CHLORIDE BLD-SCNC: 104 MMOL/L (ref 98–107)
CO2: 26 MMOL/L (ref 22–29)
CREAT SERPL-MCNC: 0.7 MG/DL (ref 0.5–1)
EOSINOPHILS ABSOLUTE: 0.21 E9/L (ref 0.05–0.5)
EOSINOPHILS RELATIVE PERCENT: 2.5 % (ref 0–6)
GFR SERPL CREATININE-BSD FRML MDRD: >60 ML/MIN/1.73
GLUCOSE BLD-MCNC: 122 MG/DL (ref 74–99)
HCT VFR BLD CALC: 37.7 % (ref 34–48)
HEMOGLOBIN: 13 G/DL (ref 11.5–15.5)
IMMATURE GRANULOCYTES #: 0.02 E9/L
IMMATURE GRANULOCYTES %: 0.2 % (ref 0–5)
INFLUENZA A BY PCR: NOT DETECTED
INFLUENZA B BY PCR: NOT DETECTED
LACTIC ACID: 1.5 MMOL/L (ref 0.5–2.2)
LIPASE: 18 U/L (ref 13–60)
LYMPHOCYTES ABSOLUTE: 1.24 E9/L (ref 1.5–4)
LYMPHOCYTES RELATIVE PERCENT: 15 % (ref 20–42)
MCH RBC QN AUTO: 33.7 PG (ref 26–35)
MCHC RBC AUTO-ENTMCNC: 34.5 % (ref 32–34.5)
MCV RBC AUTO: 97.7 FL (ref 80–99.9)
MONOCYTES ABSOLUTE: 1.12 E9/L (ref 0.1–0.95)
MONOCYTES RELATIVE PERCENT: 13.6 % (ref 2–12)
NEUTROPHILS ABSOLUTE: 5.63 E9/L (ref 1.8–7.3)
NEUTROPHILS RELATIVE PERCENT: 68.3 % (ref 43–80)
PDW BLD-RTO: 13.2 FL (ref 11.5–15)
PLATELET # BLD: 234 E9/L (ref 130–450)
PMV BLD AUTO: 9.7 FL (ref 7–12)
POTASSIUM SERPL-SCNC: 4.3 MMOL/L (ref 3.5–5)
RBC # BLD: 3.86 E12/L (ref 3.5–5.5)
SARS-COV-2, NAAT: NOT DETECTED
SODIUM BLD-SCNC: 139 MMOL/L (ref 132–146)
TOTAL PROTEIN: 7.2 G/DL (ref 6.4–8.3)
TROPONIN, HIGH SENSITIVITY: 23 NG/L (ref 0–9)
TROPONIN, HIGH SENSITIVITY: 24 NG/L (ref 0–9)
WBC # BLD: 8.3 E9/L (ref 4.5–11.5)

## 2022-12-25 PROCEDURE — 36415 COLL VENOUS BLD VENIPUNCTURE: CPT

## 2022-12-25 PROCEDURE — 87502 INFLUENZA DNA AMP PROBE: CPT

## 2022-12-25 PROCEDURE — 87635 SARS-COV-2 COVID-19 AMP PRB: CPT

## 2022-12-25 PROCEDURE — 93005 ELECTROCARDIOGRAM TRACING: CPT | Performed by: EMERGENCY MEDICINE

## 2022-12-25 PROCEDURE — 83605 ASSAY OF LACTIC ACID: CPT

## 2022-12-25 PROCEDURE — 83690 ASSAY OF LIPASE: CPT

## 2022-12-25 PROCEDURE — 71045 X-RAY EXAM CHEST 1 VIEW: CPT

## 2022-12-25 PROCEDURE — 99285 EMERGENCY DEPT VISIT HI MDM: CPT

## 2022-12-25 PROCEDURE — 84484 ASSAY OF TROPONIN QUANT: CPT

## 2022-12-25 PROCEDURE — 80053 COMPREHEN METABOLIC PANEL: CPT

## 2022-12-25 PROCEDURE — 85025 COMPLETE CBC W/AUTO DIFF WBC: CPT

## 2022-12-25 ASSESSMENT — ENCOUNTER SYMPTOMS
SINUS PRESSURE: 0
EYE DISCHARGE: 0
BACK PAIN: 0
NAUSEA: 0
DIARRHEA: 0
SHORTNESS OF BREATH: 0
ABDOMINAL DISTENTION: 0
WHEEZING: 0
VOMITING: 0
EYE PAIN: 0
COUGH: 1
SORE THROAT: 0
EYE REDNESS: 0

## 2022-12-25 ASSESSMENT — LIFESTYLE VARIABLES
HOW OFTEN DO YOU HAVE A DRINK CONTAINING ALCOHOL: 4 OR MORE TIMES A WEEK
HOW MANY STANDARD DRINKS CONTAINING ALCOHOL DO YOU HAVE ON A TYPICAL DAY: 1 OR 2

## 2022-12-25 ASSESSMENT — PAIN - FUNCTIONAL ASSESSMENT: PAIN_FUNCTIONAL_ASSESSMENT: NONE - DENIES PAIN

## 2022-12-26 NOTE — ED PROVIDER NOTES
Patient is an 81 y/o female who presents to the ED via EMS. Patient states that she has had a funny sound when she is breathing. She states that it has been there all day, however, it has now resolved. She denies any wheezing. She denies any fever. She does have a mild cough. She denies any chest pain or shortness of breath. Tonight, she pushed on her left upper abdomen and noticed that it was \"squishy. \" She denies any abdominal pain or tenderness. She denies any nausea, vomiting or diarrhea. Review of Systems   Constitutional:  Negative for chills and fever. HENT:  Negative for ear pain, sinus pressure and sore throat. Eyes:  Negative for pain, discharge and redness. Respiratory:  Positive for cough. Negative for shortness of breath and wheezing. Cardiovascular:  Negative for chest pain. Gastrointestinal:  Negative for abdominal distention, diarrhea, nausea and vomiting. Genitourinary:  Negative for dysuria and frequency. Musculoskeletal:  Negative for arthralgias and back pain. Skin:  Negative for rash and wound. Neurological:  Negative for weakness and headaches. Hematological:  Negative for adenopathy. All other systems reviewed and are negative. Physical Exam  Vitals and nursing note reviewed. HENT:      Head: Normocephalic and atraumatic. Right Ear: External ear normal.      Left Ear: External ear normal.      Nose: Nose normal.      Mouth/Throat:      Mouth: Mucous membranes are moist.   Eyes:      Conjunctiva/sclera: Conjunctivae normal.      Pupils: Pupils are equal, round, and reactive to light. Cardiovascular:      Rate and Rhythm: Normal rate and regular rhythm. Heart sounds: No murmur heard. Pulmonary:      Effort: Pulmonary effort is normal. No respiratory distress. Breath sounds: Normal breath sounds. No stridor. No wheezing, rhonchi or rales. Abdominal:      General: Bowel sounds are normal. There is no distension.       Palpations: Abdomen is soft.      Tenderness: There is no abdominal tenderness. There is no guarding. Musculoskeletal:         General: Normal range of motion. Cervical back: Normal range of motion and neck supple. Skin:     General: Skin is warm and dry. Findings: No rash. Neurological:      Mental Status: She is alert and oriented to person, place, and time. Procedures     MDM              EKG:  Paced rhythm with ventricular rate of 70. Paced rhythm. Nonspecific EKG.         --------------------------------------------- PAST HISTORY ---------------------------------------------  Past Medical History:  has a past medical history of CHF (congestive heart failure) ([facility] [address] Difficult intubation, and Kidney stones. Past Surgical History:  has a past surgical history that includes Cardiac surgery (01/08/2007); Cardiac valve replacement (01/07/07); pacemaker placement; Kidney stone surgery; Cataract removal with implant (Right, 12/10/13); Pacemaker insertion (Left); Colonoscopy; and Cataract removal with implant (Left, 05 10 2016). Social History:  reports that she has never smoked. She has never used smokeless tobacco. She reports that she does not drink alcohol and does not use drugs. Family History: family history includes Alcohol Abuse in her father; Breast Cancer in her sister; Mental Illness in her maternal grandmother. The patients home medications have been reviewed. Allergies: Patient has no known allergies.     -------------------------------------------------- RESULTS -------------------------------------------------  Labs:  Results for orders placed or performed during the hospital encounter of 12/25/22   COVID-19, Rapid    Specimen: Nasopharyngeal Swab   Result Value Ref Range    SARS-CoV-2, NAAT Not Detected Not Detected   Rapid influenza A/B antigens    Specimen: Nasopharyngeal   Result Value Ref Range    Influenza A by PCR Not Detected Not Detected    Influenza B by PCR Not Detected Not Detected   CBC with Auto Differential   Result Value Ref Range    WBC 8.3 4.5 - 11.5 E9/L    RBC 3.86 3.50 - 5.50 E12/L    Hemoglobin 13.0 11.5 - 15.5 g/dL    Hematocrit 37.7 34.0 - 48.0 %    MCV 97.7 80.0 - 99.9 fL    MCH 33.7 26.0 - 35.0 pg    MCHC 34.5 32.0 - 34.5 %    RDW 13.2 11.5 - 15.0 fL    Platelets 109 763 - 664 E9/L    MPV 9.7 7.0 - 12.0 fL    Neutrophils % 68.3 43.0 - 80.0 %    Immature Granulocytes % 0.2 0.0 - 5.0 %    Lymphocytes % 15.0 (L) 20.0 - 42.0 %    Monocytes % 13.6 (H) 2.0 - 12.0 %    Eosinophils % 2.5 0.0 - 6.0 %    Basophils % 0.4 0.0 - 2.0 %    Neutrophils Absolute 5.63 1.80 - 7.30 E9/L    Immature Granulocytes # 0.02 E9/L    Lymphocytes Absolute 1.24 (L) 1.50 - 4.00 E9/L    Monocytes Absolute 1.12 (H) 0.10 - 0.95 E9/L    Eosinophils Absolute 0.21 0.05 - 0.50 E9/L    Basophils Absolute 0.03 0.00 - 0.20 E9/L   Comprehensive Metabolic Panel   Result Value Ref Range    Sodium 139 132 - 146 mmol/L    Potassium 4.3 3.5 - 5.0 mmol/L    Chloride 104 98 - 107 mmol/L    CO2 26 22 - 29 mmol/L    Anion Gap 9 7 - 16 mmol/L    Glucose 122 (H) 74 - 99 mg/dL    BUN 23 6 - 23 mg/dL    Creatinine 0.7 0.5 - 1.0 mg/dL    Est, Glom Filt Rate >60 >=60 mL/min/1.73    Calcium 10.7 (H) 8.6 - 10.2 mg/dL    Total Protein 7.2 6.4 - 8.3 g/dL    Albumin 4.4 3.5 - 5.2 g/dL    Total Bilirubin 0.3 0.0 - 1.2 mg/dL    Alkaline Phosphatase 64 35 - 104 U/L    ALT 14 0 - 32 U/L    AST 19 0 - 31 U/L   Lipase   Result Value Ref Range    Lipase 18 13 - 60 U/L   Lactic Acid   Result Value Ref Range    Lactic Acid 1.5 0.5 - 2.2 mmol/L   Troponin   Result Value Ref Range    Troponin, High Sensitivity 23 (H) 0 - 9 ng/L   Troponin   Result Value Ref Range    Troponin, High Sensitivity 24 (H) 0 - 9 ng/L       Radiology:  XR CHEST PORTABLE   Final Result   No acute process.              ------------------------- NURSING NOTES AND VITALS REVIEWED ---------------------------  Date / Time Roomed:  12/25/2022  8:54 PM  ED Bed Assignment: 10/10    The nursing notes within the ED encounter and vital signs as below have been reviewed. BP (!) 179/82   Pulse 74   Temp 99.5 °F (37.5 °C)   Resp 18   SpO2 97%   Oxygen Saturation Interpretation: Normal      ------------------------------------------ PROGRESS NOTES ------------------------------------------  I have spoken with the patient and discussed todays results, in addition to providing specific details for the plan of care and counseling regarding the diagnosis and prognosis. Their questions are answered at this time and they are agreeable with the plan. I discussed at length with them reasons for immediate return here for re evaluation. They will followup with primary care by calling their office tomorrow. --------------------------------- ADDITIONAL PROVIDER NOTES ---------------------------------  At this time the patient is without objective evidence of an acute process requiring hospitalization or inpatient management. They have remained hemodynamically stable throughout their entire ED visit and are stable for discharge with outpatient follow-up. The plan has been discussed in detail and they are aware of the specific conditions for emergent return, as well as the importance of follow-up. New Prescriptions    No medications on file       Diagnosis:  1. Chest congestion        Disposition:  Patient's disposition: Discharge to home  Patient's condition is stable.             [address] [facility],   12/25/22 [id number]

## 2022-12-28 LAB
EKG ATRIAL RATE: 53 BPM
EKG Q-T INTERVAL: 458 MS
EKG QRS DURATION: 194 MS
EKG QTC CALCULATION (BAZETT): 494 MS
EKG R AXIS: -81 DEGREES
EKG T AXIS: 122 DEGREES
EKG VENTRICULAR RATE: 70 BPM

## 2022-12-28 PROCEDURE — 93010 ELECTROCARDIOGRAM REPORT: CPT | Performed by: INTERNAL MEDICINE

## 2023-01-04 ENCOUNTER — HOSPITAL ENCOUNTER (OUTPATIENT)
Dept: ULTRASOUND IMAGING | Age: 88
Discharge: HOME | End: 2023-01-04
Payer: MEDICARE

## 2023-01-04 DIAGNOSIS — R42 DIZZINESS: ICD-10-CM

## 2023-01-04 PROCEDURE — 93880 EXTRACRANIAL BILAT STUDY: CPT

## 2024-02-15 ENCOUNTER — HOSPITAL ENCOUNTER (OUTPATIENT)
Dept: MAMMOGRAPHY | Age: 89
Discharge: HOME | End: 2024-02-17
Payer: MEDICARE

## 2024-02-15 VITALS — HEIGHT: 66 IN | WEIGHT: 140 LBS | BODY MASS INDEX: 22.5 KG/M2

## 2024-02-15 DIAGNOSIS — Z12.31 SCREENING MAMMOGRAM, ENCOUNTER FOR: ICD-10-CM

## 2024-02-15 PROCEDURE — 77063 BREAST TOMOSYNTHESIS BI: CPT

## 2024-02-21 ENCOUNTER — CLINICAL DOCUMENTATION (OUTPATIENT)
Dept: MAMMOGRAPHY | Age: 89
End: 2024-02-21

## 2024-02-21 NOTE — PROGRESS NOTES
Self referral mammogram clinical report being sent to patient, placed in outgoing mail today. Clinical report also faxed to Dr. Carpio per signed patient authorization release of mammogram results form. Successful fax confirmation and scanned into media. MARY GRACE StanfordN, RN - Breast Navigator